# Patient Record
Sex: FEMALE | Race: BLACK OR AFRICAN AMERICAN | Employment: FULL TIME | URBAN - METROPOLITAN AREA
[De-identification: names, ages, dates, MRNs, and addresses within clinical notes are randomized per-mention and may not be internally consistent; named-entity substitution may affect disease eponyms.]

---

## 2017-10-14 PROBLEM — R10.9 ABDOMINAL PAIN: Status: ACTIVE | Noted: 2017-10-14

## 2017-10-14 PROBLEM — J90 PLEURAL EFFUSION: Status: ACTIVE | Noted: 2017-10-14

## 2017-10-14 PROBLEM — K55.069 MESENTERIC VEIN THROMBOSIS (HCC): Status: ACTIVE | Noted: 2017-10-14

## 2017-10-14 PROBLEM — I81 PORTAL VEIN THROMBOSIS: Status: ACTIVE | Noted: 2017-10-14

## 2018-04-12 ENCOUNTER — APPOINTMENT (OUTPATIENT)
Dept: CT IMAGING | Age: 33
DRG: 776 | End: 2018-04-12
Attending: PHYSICIAN ASSISTANT
Payer: COMMERCIAL

## 2018-04-12 ENCOUNTER — HOSPITAL ENCOUNTER (INPATIENT)
Age: 33
LOS: 2 days | Discharge: HOME | DRG: 776 | End: 2018-04-14
Attending: EMERGENCY MEDICINE | Admitting: INTERNAL MEDICINE
Payer: COMMERCIAL

## 2018-04-12 ENCOUNTER — APPOINTMENT (OUTPATIENT)
Dept: ULTRASOUND IMAGING | Age: 33
DRG: 776 | End: 2018-04-12
Attending: PHYSICIAN ASSISTANT
Payer: COMMERCIAL

## 2018-04-12 ENCOUNTER — APPOINTMENT (OUTPATIENT)
Dept: GENERAL RADIOLOGY | Age: 33
DRG: 776 | End: 2018-04-12
Attending: PHYSICIAN ASSISTANT
Payer: COMMERCIAL

## 2018-04-12 DIAGNOSIS — R91.1 PULMONARY NODULE, RIGHT: ICD-10-CM

## 2018-04-12 DIAGNOSIS — R00.0 TACHYCARDIA: ICD-10-CM

## 2018-04-12 DIAGNOSIS — T80.92XA BLOOD TRANSFUSION REACTION, INITIAL ENCOUNTER: ICD-10-CM

## 2018-04-12 DIAGNOSIS — D64.9 ANEMIA, UNSPECIFIED TYPE: Primary | ICD-10-CM

## 2018-04-12 DIAGNOSIS — N93.9 VAGINAL BLEEDING: ICD-10-CM

## 2018-04-12 LAB
ALBUMIN SERPL-MCNC: 2.7 G/DL (ref 3.4–5)
ALBUMIN/GLOB SERPL: 0.6 {RATIO} (ref 0.8–1.7)
ALP SERPL-CCNC: 77 U/L (ref 45–117)
ALT SERPL-CCNC: 13 U/L (ref 13–56)
ANION GAP SERPL CALC-SCNC: 13 MMOL/L (ref 3–18)
APPEARANCE UR: CLEAR
APTT PPP: 29.2 SEC (ref 23–36.4)
AST SERPL-CCNC: 14 U/L (ref 15–37)
BACTERIA URNS QL MICRO: ABNORMAL /HPF
BASOPHILS # BLD: 0 K/UL (ref 0–0.06)
BASOPHILS NFR BLD: 0 % (ref 0–2)
BILIRUB SERPL-MCNC: 0.3 MG/DL (ref 0.2–1)
BILIRUB UR QL: NEGATIVE
BUN SERPL-MCNC: 7 MG/DL (ref 7–18)
BUN/CREAT SERPL: 9 (ref 12–20)
CALCIUM SERPL-MCNC: 8.9 MG/DL (ref 8.5–10.1)
CHLORIDE SERPL-SCNC: 104 MMOL/L (ref 100–108)
CK MB CFR SERPL CALC: NORMAL % (ref 0–4)
CK MB SERPL-MCNC: <1 NG/ML (ref 5–25)
CK SERPL-CCNC: 33 U/L (ref 26–192)
CO2 SERPL-SCNC: 24 MMOL/L (ref 21–32)
COLOR UR: YELLOW
CREAT SERPL-MCNC: 0.79 MG/DL (ref 0.6–1.3)
DIFFERENTIAL METHOD BLD: ABNORMAL
EOSINOPHIL # BLD: 0.1 K/UL (ref 0–0.4)
EOSINOPHIL NFR BLD: 1 % (ref 0–5)
EPITH CASTS URNS QL MICRO: ABNORMAL /LPF (ref 0–5)
ERYTHROCYTE [DISTWIDTH] IN BLOOD BY AUTOMATED COUNT: 19.6 % (ref 11.6–14.5)
GLOBULIN SER CALC-MCNC: 4.4 G/DL (ref 2–4)
GLUCOSE SERPL-MCNC: 108 MG/DL (ref 74–99)
GLUCOSE UR STRIP.AUTO-MCNC: NEGATIVE MG/DL
HCT VFR BLD AUTO: 14.9 % (ref 35–45)
HGB BLD-MCNC: 4.5 G/DL (ref 12–16)
HGB UR QL STRIP: ABNORMAL
INR PPP: 1 (ref 0.8–1.2)
KETONES UR QL STRIP.AUTO: NEGATIVE MG/DL
LEUKOCYTE ESTERASE UR QL STRIP.AUTO: ABNORMAL
LYMPHOCYTES # BLD: 1.8 K/UL (ref 0.9–3.6)
LYMPHOCYTES NFR BLD: 18 % (ref 21–52)
MCH RBC QN AUTO: 30 PG (ref 24–34)
MCHC RBC AUTO-ENTMCNC: 30.2 G/DL (ref 31–37)
MCV RBC AUTO: 99.3 FL (ref 74–97)
MONOCYTES # BLD: 0.4 K/UL (ref 0.05–1.2)
MONOCYTES NFR BLD: 4 % (ref 3–10)
NEUTS SEG # BLD: 7.8 K/UL (ref 1.8–8)
NEUTS SEG NFR BLD: 77 % (ref 40–73)
NITRITE UR QL STRIP.AUTO: NEGATIVE
PH UR STRIP: >8.5 [PH] (ref 5–8)
PLATELET # BLD AUTO: 428 K/UL (ref 135–420)
PMV BLD AUTO: 8.7 FL (ref 9.2–11.8)
POTASSIUM SERPL-SCNC: 3.4 MMOL/L (ref 3.5–5.5)
PROT SERPL-MCNC: 7.1 G/DL (ref 6.4–8.2)
PROT UR STRIP-MCNC: NEGATIVE MG/DL
PROTHROMBIN TIME: 12.8 SEC (ref 11.5–15.2)
RBC # BLD AUTO: 1.5 M/UL (ref 4.2–5.3)
RBC #/AREA URNS HPF: ABNORMAL /HPF (ref 0–5)
RBC MORPH BLD: ABNORMAL
SODIUM SERPL-SCNC: 141 MMOL/L (ref 136–145)
SP GR UR REFRACTOMETRY: <1.005 (ref 1–1.03)
TROPONIN I SERPL-MCNC: <0.02 NG/ML (ref 0–0.06)
UROBILINOGEN UR QL STRIP.AUTO: 0.2 EU/DL (ref 0.2–1)
WBC # BLD AUTO: 10.1 K/UL (ref 4.6–13.2)
WBC URNS QL MICRO: ABNORMAL /HPF (ref 0–5)

## 2018-04-12 PROCEDURE — 80053 COMPREHEN METABOLIC PANEL: CPT | Performed by: PHYSICIAN ASSISTANT

## 2018-04-12 PROCEDURE — 86920 COMPATIBILITY TEST SPIN: CPT | Performed by: PHYSICIAN ASSISTANT

## 2018-04-12 PROCEDURE — 84702 CHORIONIC GONADOTROPIN TEST: CPT | Performed by: PHYSICIAN ASSISTANT

## 2018-04-12 PROCEDURE — 85025 COMPLETE CBC W/AUTO DIFF WBC: CPT | Performed by: PHYSICIAN ASSISTANT

## 2018-04-12 PROCEDURE — 74011250636 HC RX REV CODE- 250/636: Performed by: PHYSICIAN ASSISTANT

## 2018-04-12 PROCEDURE — 65660000000 HC RM CCU STEPDOWN

## 2018-04-12 PROCEDURE — 77030013169 SET IV BLD ICUM -A

## 2018-04-12 PROCEDURE — 71045 X-RAY EXAM CHEST 1 VIEW: CPT

## 2018-04-12 PROCEDURE — 85610 PROTHROMBIN TIME: CPT | Performed by: PHYSICIAN ASSISTANT

## 2018-04-12 PROCEDURE — 99285 EMERGENCY DEPT VISIT HI MDM: CPT

## 2018-04-12 PROCEDURE — P9016 RBC LEUKOCYTES REDUCED: HCPCS | Performed by: PHYSICIAN ASSISTANT

## 2018-04-12 PROCEDURE — 71275 CT ANGIOGRAPHY CHEST: CPT

## 2018-04-12 PROCEDURE — 82550 ASSAY OF CK (CPK): CPT | Performed by: PHYSICIAN ASSISTANT

## 2018-04-12 PROCEDURE — 94762 N-INVAS EAR/PLS OXIMTRY CONT: CPT

## 2018-04-12 PROCEDURE — 96360 HYDRATION IV INFUSION INIT: CPT

## 2018-04-12 PROCEDURE — 36430 TRANSFUSION BLD/BLD COMPNT: CPT

## 2018-04-12 PROCEDURE — 74011250637 HC RX REV CODE- 250/637: Performed by: PHYSICIAN ASSISTANT

## 2018-04-12 PROCEDURE — 86900 BLOOD TYPING SEROLOGIC ABO: CPT | Performed by: PHYSICIAN ASSISTANT

## 2018-04-12 PROCEDURE — 74011636320 HC RX REV CODE- 636/320: Performed by: EMERGENCY MEDICINE

## 2018-04-12 PROCEDURE — 96361 HYDRATE IV INFUSION ADD-ON: CPT

## 2018-04-12 PROCEDURE — 81001 URINALYSIS AUTO W/SCOPE: CPT | Performed by: PHYSICIAN ASSISTANT

## 2018-04-12 PROCEDURE — 76830 TRANSVAGINAL US NON-OB: CPT

## 2018-04-12 PROCEDURE — 85730 THROMBOPLASTIN TIME PARTIAL: CPT | Performed by: PHYSICIAN ASSISTANT

## 2018-04-12 PROCEDURE — 93005 ELECTROCARDIOGRAM TRACING: CPT

## 2018-04-12 PROCEDURE — 86078 PHYS BLOOD BANK SERV REACTJ: CPT | Performed by: PHYSICIAN ASSISTANT

## 2018-04-12 RX ORDER — ACETAMINOPHEN 500 MG
1000 TABLET ORAL
Status: COMPLETED | OUTPATIENT
Start: 2018-04-12 | End: 2018-04-12

## 2018-04-12 RX ORDER — SODIUM CHLORIDE 9 MG/ML
250 INJECTION, SOLUTION INTRAVENOUS AS NEEDED
Status: DISCONTINUED | OUTPATIENT
Start: 2018-04-12 | End: 2018-04-14 | Stop reason: HOSPADM

## 2018-04-12 RX ADMIN — SODIUM CHLORIDE 1000 ML: 900 INJECTION, SOLUTION INTRAVENOUS at 20:57

## 2018-04-12 RX ADMIN — ACETAMINOPHEN 1000 MG: 500 TABLET ORAL at 22:58

## 2018-04-12 RX ADMIN — IOPAMIDOL 100 ML: 755 INJECTION, SOLUTION INTRAVENOUS at 21:16

## 2018-04-12 RX ADMIN — SODIUM CHLORIDE 1000 ML: 900 INJECTION, SOLUTION INTRAVENOUS at 23:03

## 2018-04-12 NOTE — IP AVS SNAPSHOT
Mike Kee 
 
 
 [address] [facility] [address] 
[phone] Patient: Elaine Olivera MRN: [id number] [id number] About your hospitalization You were admitted on:  April 12, 2018 You last received care in the:  [address] You were discharged on:  April 14, 2018 Why you were hospitalized Your primary diagnosis was: Anemia Your diagnoses also included:  Vaginal Bleeding, Miscarriage, Atelectasis, Coagulation Defect (Hcc), Tachycardia, Pulmonary Nodule, Mesenteric Vein Thrombosis, Portal Vein Thrombosis Follow-up Information Follow up With Details Comments Contact Info None In 3 days  None ([phone] Patient stated that they have no PCP 
  
 GYN  In 3 days Dr. Nancy Joy oncology on Monday  In 2 days Discharge Orders None A check brigitte indicates which time of day the medication should be taken. My Medications CONTINUE taking these medications Instructions Each Dose to Equal  
 Morning Noon Evening Bedtime  
 ferrous sulfate 325 mg (65 mg iron) tablet Your last dose was: Your next dose is: Take 1 Tab by mouth daily (with breakfast). 325 mg  
    
   
   
   
  
 oxyCODONE-acetaminophen 5-325 mg per tablet Commonly known as:  PERCOCET Your last dose was: Your next dose is: Take 1 Tab by mouth every eight (8) hours as needed. Max Daily Amount: 3 Tabs. 1 Tab PriLOSEC 40 mg capsule Generic drug:  omeprazole Your last dose was: Your next dose is: Take 40 mg by mouth daily. 40 mg  
    
   
   
   
  
  
STOP taking these medications   
 rivaroxaban 15 mg (42)- 20 mg (9) Dspk Commonly known as:  Valorie Gonzalez Opioid Education  Prescription Opioids: What You Need to Know: 
 
Prescription opioids can be used to help relieve moderate-to-severe pain and are often prescribed following a surgery or injury, or for certain health conditions. These medications can be an important part of treatment but also come with serious risks. Opioids are strong pain medicines. Examples include hydrocodone, oxycodone, fentanyl, and morphine. Heroin is an example of an illegal opioid. It is important to work with your health care provider to make sure you are getting the safest, most effective care. WHAT ARE THE RISKS AND SIDE EFFECTS OF OPIOID USE? Prescription opioids carry serious risks of addiction and overdose, especially with prolonged use. An opioid overdose, often marked by slow breathing, can cause sudden death. The use of prescription opioids can have a number of side effects as well, even when taken as directed. · Tolerance-meaning you might need to take more of a medication for the same pain relief · Physical dependence-meaning you have symptoms of withdrawal when the medication is stopped. Withdrawal symptoms can include nausea, sweating, chills, diarrhea, stomach cramps, and muscle aches. Withdrawal can last up to several weeks, depending on which drug you took and how long you took it. · Increased sensitivity to pain · Constipation · Nausea, vomiting, and dry mouth · Sleepiness and dizziness · Confusion · Depression · Low levels of testosterone that can result in lower sex drive, energy, and strength · Itching and sweating RISKS ARE GREATER WITH:      
· History of drug misuse, substance use disorder, or overdose · Mental health conditions (such as depression or anxiety) · Sleep apnea · Older age (72 years or older) · Pregnancy Avoid alcohol while taking prescription opioids. Also, unless specifically advised by your health care provider, medications to avoid include: · Benzodiazepines (such as Xanax or Valium) · Muscle relaxants (such as Soma or Flexeril) · Hypnotics (such as Ambien or Lunesta) · Other prescription opioids KNOW YOUR OPTIONS Talk to your health care provider about ways to manage your pain that don't involve prescription opioids. Some of these options may actually work better and have fewer risks and side effects. Options may include: 
· Pain relievers such as acetaminophen, ibuprofen, and naproxen · Some medications that are also used for depression or seizures · Physical therapy and exercise · Counseling to help patients learn how to cope better with triggers of pain and stress. · Application of heat or cold compress · Massage therapy · Relaxation techniques Be Informed Make sure you know the name of your medication, how much and how often to take it, and its potential risks & side effects. IF YOU ARE PRESCRIBED OPIOIDS FOR PAIN: 
· Never take opioids in greater amounts or more often than prescribed. Remember the goal is not to be pain-free but to manage your pain at a tolerable level. · Follow up with your primary care provider to: · Work together to create a plan on how to manage your pain. · Talk about ways to help manage your pain that don't involve prescription opioids. · Talk about any and all concerns and side effects. · Help prevent misuse and abuse. · Never sell or share prescription opioids · Help prevent misuse and abuse. · Store prescription opioids in a secure place and out of reach of others (this may include visitors, children, friends, and family). · Safely dispose of unused/unwanted prescription opioids: Find your community drug take-back program or your pharmacy mail-back program, or flush them down the toilet, following guidance from the Food and Drug Administration (www.fda.gov/Drugs/ResourcesForYou). · Visit www.cdc.gov/drugoverdose to learn about the risks of opioid abuse and overdose. · If you believe you may be struggling with addiction, tell your health care provider and ask for guidance or call [employer] at 2-356-006-LBKJ. Discharge Instructions Anemia: Care Instructions Your Care Instructions Anemia is a low level of red blood cells, which carry oxygen throughout your body. Many things can cause anemia. Lack of iron is one of the most common causes. Your body needs iron to make hemoglobin, a substance in red blood cells that carries oxygen from the lungs to your body's cells. Without enough iron, the body produces fewer and smaller red blood cells. As a result, your body's cells do not get enough oxygen, and you feel tired and weak. And you may have trouble concentrating. Bleeding is the most common cause of a lack of iron. You may have heavy menstrual bleeding or bleeding caused by conditions such as ulcers, hemorrhoids, or cancer. Regular use of aspirin or other anti-inflammatory medicines (such as ibuprofen) also can cause bleeding in some people. A lack of iron in your diet also can cause anemia, especially at times when the body needs more iron, such as during pregnancy, infancy, and the teen years. Your doctor may have prescribed iron pills. It may take several months of treatment for your iron levels to return to normal. Your doctor also may suggest that you eat foods that are rich in iron, such as meat and beans. There are many other causes of anemia. It is not always due to a lack of iron. Finding the specific cause of your anemia will help your doctor find the right treatment for you. Follow-up care is a key part of your treatment and safety. Be sure to make and go to all appointments, and call your doctor if you are having problems. It's also a good idea to know your test results and keep a list of the medicines you take. How can you care for yourself at home? · Take your medicines exactly as prescribed. Call your doctor if you think you are having a problem with your medicine. · If your doctor recommends iron pills, take them as directed: ¨ Try to take the pills on an empty stomach about 1 hour before or 2 hours after meals. But you may need to take iron with food to avoid an upset stomach. ¨ Do not take antacids or drink milk or caffeine drinks (such as coffee, tea, or cola) at the same time or within 2 hours of the time that you take your iron. They can make it hard for your body to absorb the iron. ¨ Vitamin C (from food or supplements) helps your body absorb iron. Try taking iron pills with a glass of orange juice or some other food that is high in vitamin C, such as citrus fruits. ¨ Iron pills may cause stomach problems, such as heartburn, nausea, diarrhea, constipation, and cramps. Be sure to drink plenty of fluids, and include fruits, vegetables, and fiber in your diet each day. Iron pills often make your bowel movements dark or green. ¨ If you forget to take an iron pill, do not take a double dose of iron the next time you take a pill. ¨ Keep iron pills out of the reach of small children. An overdose of iron can be very dangerous. · Follow your doctor's advice about eating iron-rich foods. These include red meat, shellfish, poultry, eggs, beans, raisins, whole-grain bread, and leafy green vegetables. · Steam vegetables to help them keep their iron content. When should you call for help? Call 911 anytime you think you may need emergency care. For example, call if: 
? · You have symptoms of a heart attack. These may include: ¨ Chest pain or pressure, or a strange feeling in the chest. 
¨ Sweating. ¨ Shortness of breath. ¨ Nausea or vomiting. ¨ Pain, pressure, or a strange feeling in the back, neck, jaw, or upper belly or in one or both shoulders or arms. ¨ Lightheadedness or sudden weakness. ¨ A fast or irregular heartbeat. After you call 911, the  may tell you to chew 1 adult-strength or 2 to 4 low-dose aspirin. Wait for an ambulance.  Do not try to drive yourself. ? · You passed out (lost consciousness). ?Call your doctor now or seek immediate medical care if: 
? · You have new or increased shortness of breath. ? · You are dizzy or lightheaded, or you feel like you may faint. ? · Your fatigue and weakness continue or get worse. ? · You have any abnormal bleeding, such as: 
¨ Nosebleeds. ¨ Vaginal bleeding that is different (heavier, more frequent, at a different time of the month) than what you are used to. ¨ Bloody or black stools, or rectal bleeding. ¨ Bloody or pink urine. ? Watch closely for changes in your health, and be sure to contact your doctor if: 
? · You do not get better as expected. Where can you learn more? Go to http://darian-marcus.info/. Enter R301 in the search box to learn more about \"Anemia: Care Instructions. \" Current as of: October 13, 2016 Content Version: 11.4 © 5718-6882 [employer]. Care instructions adapted under license by [employer] (which disclaims liability or warranty for this information). If you have questions about a medical condition or this instruction, always ask your healthcare professional. [place] [address] any warranty or liability for your use of this information. [employer] Announcement We are excited to announce that we are making your provider's discharge notes available to you in [employer]centric. You will see these notes when they are completed and signed by the physician that discharged you from your recent hospital stay. If you have any questions or concerns about any information you see in Nascentric, please call the Health Information Department where you were seen or reach out to your Primary Care Provider for more information about your plan of care. Introducing [facility]! Dear Con Hu: Thank you for requesting a [employer] account.   Our records indicate that you already have an active [employer] account. You can access your account anytime at [email or web address] Did you know that you can access your hospital and ER discharge instructions at any time in Avexxin? You can also review all of your test results from your hospital stay or ER visit. Additional Information If you have questions, please visit the Frequently Asked Questions section of the [employer] website at [email or web address] Remember, Avexxin is NOT to be used for urgent needs. For medical emergencies, dial 911. Now available from your iPhone and Android! Introducing Rodney Torres As a [employer] [facility] patient, I wanted to make you aware of our electronic visit tool called Rodney Torres. [employer] allows you to connect within minutes with a medical provider 24 hours a day, seven days a week via a mobile device or tablet or logging into a secure website from your computer. You can access Rodney Torres from anywhere in the [place]. A virtual visit might be right for you when you have a simple condition and feel like you just dont want to get out of bed, or cant get away from work for an appointment, when your regular [employer] provider is not available (evenings, weekends or holidays), or when youre out of town and need minor care. Electronic visits cost only $49 and if the [employer] provider determines a prescription is needed to treat your condition, one can be electronically transmitted to a nearby pharmacy*. Please take a moment to enroll today if you have not already done so. The enrollment process is free and takes just a few minutes. To enroll, please download the K[employer] rosalie to your tablet or phone, or visit [email or web address] to enroll on your computer.    
And, as an [address] [place] [address] patient with a [employer] account, the results of your visits will be scanned into your electronic medical record and your primary care provider will be able to view the scanned results. We urge you to continue to see your regular Burt Ndiaye provider for your ongoing medical care. And while your primary care provider may not be the one available when you seek a Rodney Cabralesfin virtual visit, the peace of mind you get from getting a real diagnosis real time can be priceless. For more information on [employer], view our Frequently Asked Questions (FAQs) at [email or web address]. Sincerely, 
 
Hope Ponce MD 
Chief Medical Officer [address] *:  certain medications cannot be prescribed via [employer] Unresulted Labs-Please follow up with your PCP about these lab tests Order Current Status EKG, 12 LEAD, INITIAL Preliminary result Providers Seen During Your Hospitalization Provider Specialty Primary office phone Sadaf Young MD Emergency Medicine [phone] Yasmani Cid MD Internal Medicine [phone] Your Primary Care Physician (PCP) Primary Care Physician Office Phone Office Fax NONE ** None ** ** None ** You are allergic to the following No active allergies Recent Documentation Height Weight Breastfeeding? BMI OB Status Smoking Status 1.626 m 77 kg No 29.15 kg/m2 Pregnant Former Smoker Emergency Contacts Name Discharge Info Relation Home Work Mobile Samantha Hinkle DISCHARGE CAREGIVER [3] Parent [1] [phone] Patient Belongings The following personal items are in your possession at time of discharge: 
     Visual Aid: Glasses, With patient             Clothing: At bedside    Other Valuables:  (glasses, cell phone) Please provide this summary of care documentation to your next provider. Signatures-by signing, you are acknowledging that this After Visit Summary has been reviewed with you and you have received a copy. Patient Signature:  ____________________________________________________________ Date:  ____________________________________________________________  
  
Guinevere Coup Provider Signature:  ____________________________________________________________ Date:  ____________________________________________________________

## 2018-04-12 NOTE — IP AVS SNAPSHOT
[address] [address] 
[phone] Patient: Alyssa Harvey MRN: [id number] CVQ:7/4/1922 A check brigitte indicates which time of day the medication should be taken. My Medications CONTINUE taking these medications Instructions Each Dose to Equal  
 Morning Noon Evening Bedtime  
 ferrous sulfate 325 mg (65 mg iron) tablet Your last dose was: Your next dose is: Take 1 Tab by mouth daily (with breakfast). 325 mg  
    
   
   
   
  
 oxyCODONE-acetaminophen 5-325 mg per tablet Commonly known as:  PERCOCET Your last dose was: Your next dose is: Take 1 Tab by mouth every eight (8) hours as needed. Max Daily Amount: 3 Tabs. 1 Tab PriLOSEC 40 mg capsule Generic drug:  omeprazole Your last dose was: Your next dose is: Take 40 mg by mouth daily. 40 mg  
    
   
   
   
  
  
STOP taking these medications   
 rivaroxaban 15 mg (42)- 20 mg (9) Dspk Commonly known as:  Abner Whitmore

## 2018-04-12 NOTE — IP AVS SNAPSHOT
Summary of Care Report The Summary of Care report has been created to help improve care coordination. Users with access to [facility] or [address] (Web-based application) may access additional patient information including the Discharge Summary. If you are not currently a [address] user and need more information, please call the number listed below in the [place] [address] section and ask to be connected with Medical Records. Facility Information Name Address Phone [address] [place] [id number] [phone] Patient Information Patient Name Sex JT Gordon ([id number]) Female 1985 Discharge Information Admitting Provider Service Area Unit Monica Murry MD / [address] [place] [address]/Med / [phone] Discharge Provider Discharge Date/Time Discharge Disposition Destination (none) 2018 (Pending) AHR (none) Patient Language Language ENGLISH [13] Hospital Problems as of 2018  Reviewed: 2018  7:06 AM by Derick Mitchell MD  
  
  
  
 Class Noted - Resolved Last Modified POA Active Problems Portal vein thrombosis  10/14/2017 - Present 2018 by Monica Murry MD Yes Entered by Aaron Avery MD  
  Mesenteric vein thrombosis  10/14/2017 - Present 2018 by Monica Murry MD Yes Entered by Aaron Avery MD  
  Vaginal bleeding  2018 - Present 2018 by AKBAR Ulloa Unknown Entered by AKBAR Ulloa  
  * (Principal)Anemia  2018 - Present 2018 by Monica Murry MD Unknown Entered by AKBAR Ulloa Miscarriage  2018 - Present 2018 by Monica Murry MD Unknown   Entered by Monica Murry MD  
 Atelectasis  4/13/2018 - Present 4/13/2018 by Dorotha Goldmann, MD Unknown Entered by Dorotha Goldmann, MD  
  Coagulation defect [facility])  4/13/2018 - Present 4/13/2018 by Dorotha Goldmann, MD Unknown Entered by Dorotha Goldmann, MD  
  Tachycardia  4/13/2018 - Present 4/13/2018 by Dorotha Goldmann, MD Unknown Entered by Dorotha Goldmann, MD  
  Pulmonary nodule  4/13/2018 - Present 4/13/2018 by Dorotha Goldmann, MD Unknown Entered by Dorotha Goldmann, MD  
  
Non-Hospital Problems as of 4/14/2018  Reviewed: 4/13/2018  7:06 AM by Leyla Beck MD  
  
  
  
 Class Noted - Resolved Last Modified Active Problems Abdominal pain  10/14/2017 - Present 10/14/2017 by Alba Ramos MD  
  Entered by Alba Ramos MD  
  Pleural effusion  10/14/2017 - Present 10/23/2017 by Padmini Renteria MD  
  Entered by Alba Ramos MD  
  
You are allergic to the following No active allergies Current Discharge Medication List  
  
CONTINUE these medications which have NOT CHANGED Dose & Instructions Dispensing Information Comments  
 ferrous sulfate 325 mg (65 mg iron) tablet Dose:  325 mg Take 1 Tab by mouth daily (with breakfast). Quantity:  30 Tab Refills:  0  
   
 oxyCODONE-acetaminophen 5-325 mg per tablet Commonly known as:  PERCOCET Dose:  1 Tab Take 1 Tab by mouth every eight (8) hours as needed. Max Daily Amount: 3 Tabs. Quantity:  15 Tab Refills:  0 PriLOSEC 40 mg capsule Generic drug:  omeprazole Dose:  40 mg Take 40 mg by mouth daily. Refills:  0 STOP taking these medications Comments  
 rivaroxaban 15 mg (42)- 20 mg (9) Dspk Commonly known as:  Pilar Benes Follow-up Information Follow up With Details Comments Contact Info None In 3 days  None ([phone]) Patient stated that they have no PCP 
  
 GYN  In 3 days Dr. Saskia Farfan oncology on Monday  In 2 days Discharge Instructions Anemia: Care Instructions Your Care Instructions Anemia is a low level of red blood cells, which carry oxygen throughout your body. Many things can cause anemia. Lack of iron is one of the most common causes. Your body needs iron to make hemoglobin, a substance in red blood cells that carries oxygen from the lungs to your body's cells. Without enough iron, the body produces fewer and smaller red blood cells. As a result, your body's cells do not get enough oxygen, and you feel tired and weak. And you may have trouble concentrating. Bleeding is the most common cause of a lack of iron. You may have heavy menstrual bleeding or bleeding caused by conditions such as ulcers, hemorrhoids, or cancer. Regular use of aspirin or other anti-inflammatory medicines (such as ibuprofen) also can cause bleeding in some people. A lack of iron in your diet also can cause anemia, especially at times when the body needs more iron, such as during pregnancy, infancy, and the teen years. Your doctor may have prescribed iron pills. It may take several months of treatment for your iron levels to return to normal. Your doctor also may suggest that you eat foods that are rich in iron, such as meat and beans. There are many other causes of anemia. It is not always due to a lack of iron. Finding the specific cause of your anemia will help your doctor find the right treatment for you. Follow-up care is a key part of your treatment and safety. Be sure to make and go to all appointments, and call your doctor if you are having problems. It's also a good idea to know your test results and keep a list of the medicines you take. How can you care for yourself at home? · Take your medicines exactly as prescribed. Call your doctor if you think you are having a problem with your medicine. · If your doctor recommends iron pills, take them as directed: ¨ Try to take the pills on an empty stomach about 1 hour before or 2 hours after meals. But you may need to take iron with food to avoid an upset stomach. ¨ Do not take antacids or drink milk or caffeine drinks (such as coffee, tea, or cola) at the same time or within 2 hours of the time that you take your iron. They can make it hard for your body to absorb the iron. ¨ Vitamin C (from food or supplements) helps your body absorb iron. Try taking iron pills with a glass of orange juice or some other food that is high in vitamin C, such as citrus fruits. ¨ Iron pills may cause stomach problems, such as heartburn, nausea, diarrhea, constipation, and cramps. Be sure to drink plenty of fluids, and include fruits, vegetables, and fiber in your diet each day. Iron pills often make your bowel movements dark or green. ¨ If you forget to take an iron pill, do not take a double dose of iron the next time you take a pill. ¨ Keep iron pills out of the reach of small children. An overdose of iron can be very dangerous. · Follow your doctor's advice about eating iron-rich foods. These include red meat, shellfish, poultry, eggs, beans, raisins, whole-grain bread, and leafy green vegetables. · Steam vegetables to help them keep their iron content. When should you call for help? Call 911 anytime you think you may need emergency care. For example, call if: 
? · You have symptoms of a heart attack. These may include: ¨ Chest pain or pressure, or a strange feeling in the chest. 
¨ Sweating. ¨ Shortness of breath. ¨ Nausea or vomiting. ¨ Pain, pressure, or a strange feeling in the back, neck, jaw, or upper belly or in one or both shoulders or arms. ¨ Lightheadedness or sudden weakness. ¨ A fast or irregular heartbeat. After you call 911, the  may tell you to chew 1 adult-strength or 2 to 4 low-dose aspirin. Wait for an ambulance. Do not try to drive yourself. ? · You passed out (lost consciousness). ?Call your doctor now or seek immediate medical care if: 
? · You have new or increased shortness of breath. ? · You are dizzy or lightheaded, or you feel like you may faint. ? · Your fatigue and weakness continue or get worse. ? · You have any abnormal bleeding, such as: 
¨ Nosebleeds. ¨ Vaginal bleeding that is different (heavier, more frequent, at a different time of the month) than what you are used to. ¨ Bloody or black stools, or rectal bleeding. ¨ Bloody or pink urine. ? Watch closely for changes in your health, and be sure to contact your doctor if: 
? · You do not get better as expected. Where can you learn more? Go to http://darian-marcus.info/. Enter R301 in the search box to learn more about \"Anemia: Care Instructions. \" Current as of: October 13, 2016 Content Version: 11.4 © 4153-4799 [employer]. Care instructions adapted under license by [employer] (which disclaims liability or warranty for this information). If you have questions about a medical condition or this instruction, always ask your healthcare professional. [place] [address] any warranty or liability for your use of this information. Chart Review Routing History No Routing History on File

## 2018-04-12 NOTE — Clinical Note
Status[de-identified] Inpatient [101] Type of Bed: Intensive Care [6] Inpatient Hospitalization Certified Necessary for the Following Reasons: 3. Patient receiving treatment that can only be provided in an inpatient setting (further clarification in H&P documentation) Admitting Diagnosis: Anemia [029823] Admitting Diagnosis: Vaginal bleeding [136144] Admitting Physician: Senait Riley [[id number]] Attending Physician: Senait Riley [[id number]] Estimated Length of Stay: 2 Midnights Discharge Plan[de-identified] Home with Office Follow-up

## 2018-04-13 PROBLEM — D68.9 COAGULATION DEFECT (HCC): Status: ACTIVE | Noted: 2018-04-13

## 2018-04-13 PROBLEM — R91.1 PULMONARY NODULE: Status: ACTIVE | Noted: 2018-04-13

## 2018-04-13 PROBLEM — O03.9 MISCARRIAGE: Status: ACTIVE | Noted: 2018-04-13

## 2018-04-13 PROBLEM — J98.11 ATELECTASIS: Status: ACTIVE | Noted: 2018-04-13

## 2018-04-13 PROBLEM — R00.0 TACHYCARDIA: Status: ACTIVE | Noted: 2018-04-13

## 2018-04-13 LAB
ABO + RH BLD: NORMAL
ABO/RH(D) PRE-TXN, PRETT: NORMAL
BASOPHILS # BLD: 0 K/UL (ref 0–0.1)
BASOPHILS NFR BLD: 0 % (ref 0–3)
BLD PROD TYP BPU: NORMAL
BLOOD BAG HEMOLYSIS,BLBAG: NORMAL
CLERICAL ERRORS,CLERR: NORMAL
DAT P TRANSF RBC QL: NORMAL
DAT RBC QL: NORMAL
DIFFERENTIAL METHOD BLD: ABNORMAL
EOSINOPHIL # BLD: 0.1 K/UL (ref 0–0.4)
EOSINOPHIL NFR BLD: 1 % (ref 0–5)
ERYTHROCYTE [DISTWIDTH] IN BLOOD BY AUTOMATED COUNT: 18.6 % (ref 11.6–14.5)
FIBRINOGEN PPP-MCNC: 326 MG/DL (ref 210–451)
HCG SERPL-ACNC: 9 MIU/ML (ref 1–6)
HCT VFR BLD AUTO: 13.4 % (ref 35–45)
HCT VFR BLD AUTO: 21 % (ref 35–45)
HEMOLYSIS, POST TXN,PSTHE: NORMAL
HEMOLYSIS,PRE TXN,PREHE: NORMAL
HGB BLD-MCNC: 4.2 G/DL (ref 12–16)
HGB BLD-MCNC: 6.9 G/DL (ref 12–16)
LYMPHOCYTES # BLD: 0.7 K/UL (ref 0.8–3.5)
LYMPHOCYTES NFR BLD: 8 % (ref 20–51)
MCH RBC QN AUTO: 30.4 PG (ref 24–34)
MCHC RBC AUTO-ENTMCNC: 31.3 G/DL (ref 31–37)
MCV RBC AUTO: 97.1 FL (ref 74–97)
MONOCYTES # BLD: 0.4 K/UL (ref 0–1)
MONOCYTES NFR BLD: 4 % (ref 2–9)
NEUTS BAND NFR BLD MANUAL: 2 % (ref 0–5)
NEUTS SEG # BLD: 7.9 K/UL (ref 1.8–8)
NEUTS SEG NFR BLD: 85 % (ref 42–75)
NRBC BLD-RTO: 1 PER 100 WBC
PATH REV BLD -IMP: NORMAL
PLATELET # BLD AUTO: 262 K/UL (ref 135–420)
PMV BLD AUTO: 7.9 FL (ref 9.2–11.8)
RBC # BLD AUTO: 1.38 M/UL (ref 4.2–5.3)
RBC MORPH BLD: ABNORMAL
UNIT NUMBER,UN: NORMAL
WBC # BLD AUTO: 9.3 K/UL (ref 4.6–13.2)

## 2018-04-13 PROCEDURE — 85384 FIBRINOGEN ACTIVITY: CPT | Performed by: HOSPITALIST

## 2018-04-13 PROCEDURE — 74011250637 HC RX REV CODE- 250/637: Performed by: INTERNAL MEDICINE

## 2018-04-13 PROCEDURE — 36430 TRANSFUSION BLD/BLD COMPNT: CPT

## 2018-04-13 PROCEDURE — 85025 COMPLETE CBC W/AUTO DIFF WBC: CPT | Performed by: INTERNAL MEDICINE

## 2018-04-13 PROCEDURE — 74011250636 HC RX REV CODE- 250/636: Performed by: INTERNAL MEDICINE

## 2018-04-13 PROCEDURE — 74011250637 HC RX REV CODE- 250/637: Performed by: HOSPITALIST

## 2018-04-13 PROCEDURE — 65660000000 HC RM CCU STEPDOWN

## 2018-04-13 PROCEDURE — 36415 COLL VENOUS BLD VENIPUNCTURE: CPT | Performed by: HOSPITALIST

## 2018-04-13 PROCEDURE — 30233N1 TRANSFUSION OF NONAUTOLOGOUS RED BLOOD CELLS INTO PERIPHERAL VEIN, PERCUTANEOUS APPROACH: ICD-10-PCS | Performed by: INTERNAL MEDICINE

## 2018-04-13 PROCEDURE — 85018 HEMOGLOBIN: CPT | Performed by: HOSPITALIST

## 2018-04-13 PROCEDURE — P9016 RBC LEUKOCYTES REDUCED: HCPCS | Performed by: PHYSICIAN ASSISTANT

## 2018-04-13 PROCEDURE — 77030013131 HC IV BLD ST ICUM -A

## 2018-04-13 RX ORDER — SODIUM CHLORIDE 9 MG/ML
125 INJECTION, SOLUTION INTRAVENOUS CONTINUOUS
Status: DISCONTINUED | OUTPATIENT
Start: 2018-04-13 | End: 2018-04-14 | Stop reason: HOSPADM

## 2018-04-13 RX ORDER — OXYCODONE AND ACETAMINOPHEN 10; 325 MG/1; MG/1
1 TABLET ORAL
Status: DISCONTINUED | OUTPATIENT
Start: 2018-04-13 | End: 2018-04-13

## 2018-04-13 RX ORDER — ONDANSETRON 2 MG/ML
4 INJECTION INTRAMUSCULAR; INTRAVENOUS
Status: DISCONTINUED | OUTPATIENT
Start: 2018-04-13 | End: 2018-04-14 | Stop reason: HOSPADM

## 2018-04-13 RX ORDER — HYDROCODONE BITARTRATE AND ACETAMINOPHEN 5; 325 MG/1; MG/1
1 TABLET ORAL
Status: DISCONTINUED | OUTPATIENT
Start: 2018-04-13 | End: 2018-04-13

## 2018-04-13 RX ORDER — SODIUM CHLORIDE 9 MG/ML
250 INJECTION, SOLUTION INTRAVENOUS AS NEEDED
Status: DISCONTINUED | OUTPATIENT
Start: 2018-04-13 | End: 2018-04-14 | Stop reason: HOSPADM

## 2018-04-13 RX ORDER — TRANEXAMIC ACID 650 1/1
1300 TABLET ORAL 3 TIMES DAILY
Status: DISCONTINUED | OUTPATIENT
Start: 2018-04-13 | End: 2018-04-14

## 2018-04-13 RX ORDER — OXYCODONE AND ACETAMINOPHEN 10; 325 MG/1; MG/1
2 TABLET ORAL
Status: DISCONTINUED | OUTPATIENT
Start: 2018-04-13 | End: 2018-04-14 | Stop reason: HOSPADM

## 2018-04-13 RX ORDER — DIPHENHYDRAMINE HYDROCHLORIDE 50 MG/ML
12.5 INJECTION, SOLUTION INTRAMUSCULAR; INTRAVENOUS
Status: DISCONTINUED | OUTPATIENT
Start: 2018-04-13 | End: 2018-04-14 | Stop reason: HOSPADM

## 2018-04-13 RX ADMIN — TRANEXAMIC ACID 1300 MG: 650 TABLET ORAL at 15:53

## 2018-04-13 RX ADMIN — SODIUM CHLORIDE 125 ML/HR: 900 INJECTION, SOLUTION INTRAVENOUS at 02:11

## 2018-04-13 RX ADMIN — DIPHENHYDRAMINE HYDROCHLORIDE 12.5 MG: 50 INJECTION, SOLUTION INTRAMUSCULAR; INTRAVENOUS at 01:56

## 2018-04-13 RX ADMIN — OXYCODONE AND ACETAMINOPHEN 1 TABLET: 10; 325 TABLET ORAL at 14:23

## 2018-04-13 RX ADMIN — TRANEXAMIC ACID 1300 MG: 650 TABLET ORAL at 22:00

## 2018-04-13 RX ADMIN — ACETAMINOPHEN 650 MG: 325 SOLUTION ORAL at 17:26

## 2018-04-13 RX ADMIN — OXYCODONE AND ACETAMINOPHEN 1 TABLET: 10; 325 TABLET ORAL at 20:25

## 2018-04-13 NOTE — ED PROVIDER NOTES
EMERGENCY DEPARTMENT HISTORY AND PHYSICAL EXAM    Date: 4/12/2018  Patient Name: King Cronin    History of Presenting Illness     Chief Complaint   Patient presents with    Dizziness    Vaginal Bleeding         History Provided By: Patient    Chief Complaint: Vaginal bleeding  Duration: 2 Weeks  Timing:  Progressive and Worsening  Location: Vagina  Associated Symptoms: SOB, dizziness    Additional History (Context):   8:33 PM  King Cronin is a 29 y.o. female with PMHX of gastritis, blood clots, and recent anemia diagnosis (after D&C miscarriage) who presents to the emergency department C/O vaginal bleeding, onset 2 weeks ago s/p still birth at ~4 months. Associated sxs include SOB, dizziness. Pt reports that she is using 1 pad/hour. Pt states that she was started on Xarelto in October for \"abd clots\" switched to Lovenox in December when she was found to be pregnant and back to Xarelto a few weeks ago after miscarriage. Pt's OB is Dr. Floresita Duke. Pt denies abd pain (although she has a fibroid), leg swelling, hx of transfusion, and any other sxs or complaints.      PCP: None    Current Facility-Administered Medications   Medication Dose Route Frequency Provider Last Rate Last Dose    0.9% sodium chloride infusion  125 mL/hr IntraVENous CONTINUOUS Chana Castro  mL/hr at 04/13/18 0211 125 mL/hr at 04/13/18 0211    acetaminophen (TYLENOL) solution 650 mg  650 mg Oral Q4H PRN Chana Castro MD        HYDROcodone-acetaminophen (NORCO) 5-325 mg per tablet 1 Tab  1 Tab Oral Q4H PRN Chana Castro MD        diphenhydrAMINE (BENADRYL) injection 12.5 mg  12.5 mg IntraVENous Q4H PRN Chana Castro MD   12.5 mg at 04/13/18 0156    ondansetron (ZOFRAN) injection 4 mg  4 mg IntraVENous Q4H PRN Chana Castro MD        0.9% sodium chloride infusion 250 mL  250 mL IntraVENous PRN Chana Castro MD        tranexamic acid (LYSTEDA) tablet 1,300 mg  1,300 mg Oral TID Boy Fontana MD        0.9% sodium chloride infusion 250 mL  250 mL IntraVENous PRN AKBAR Neri         Current Outpatient Prescriptions   Medication Sig Dispense Refill    ferrous sulfate 325 mg (65 mg iron) tablet Take 1 Tab by mouth daily (with breakfast). 30 Tab 0    oxyCODONE-acetaminophen (PERCOCET) 5-325 mg per tablet Take 1 Tab by mouth every eight (8) hours as needed. Max Daily Amount: 3 Tabs. 15 Tab 0    rivaroxaban (XARELTO) 15 mg (42)- 20 mg (9) DsPk Take one 15 mg tablet twice a day with food for the first 21 days. Then, take one 20 mg tablet once a day with food for 9 days. Indications: splenic and mesenteric vein thrombosis 1 Dose Pack 0    omeprazole (PRILOSEC) 40 mg capsule Take 40 mg by mouth daily. Past History     Past Medical History:  Past Medical History:   Diagnosis Date    Anemia     Anemia     History of blood clots 10/15/2017    Intestinal       Past Surgical History:  Past Surgical History:   Procedure Laterality Date    HX DILATION AND CURETTAGE         Family History:  History reviewed. No pertinent family history. Social History:  Social History   Substance Use Topics    Smoking status: Former Smoker    Smokeless tobacco: Never Used    Alcohol use No       Allergies:  No Known Allergies      Review of Systems   Review of Systems   Respiratory: Positive for shortness of breath. Cardiovascular: Negative for chest pain. Gastrointestinal: Negative for abdominal pain. Genitourinary: Positive for vaginal bleeding. Neurological: Positive for dizziness and light-headedness. All other systems reviewed and are negative.       Physical Exam     Vitals:    04/13/18 0000 04/13/18 0006 04/13/18 0130 04/13/18 0200   BP: (!) 124/92  112/66 112/66   Pulse: (!) 125  (!) 108 (!) 119   Resp: 20  17 18   Temp:  99.2 °F (37.3 °C)  98.7 °F (37.1 °C)   SpO2: 100%  100% 100%   Weight:       Height:         Physical Exam   Constitutional: She is oriented to person, place, and time. She appears well-developed and well-nourished. Alert, lying on stretcher, appears slightly anxious    HENT:   Head: Normocephalic and atraumatic. Neck: Normal range of motion. Neck supple. Cardiovascular: Regular rhythm, normal heart sounds and intact distal pulses. Tachycardia present. No murmur heard. Pulmonary/Chest: Effort normal and breath sounds normal. No respiratory distress. She has no wheezes. She has no rales. Abdominal: Soft. Bowel sounds are normal. She exhibits no distension. There is no tenderness. There is no rebound and no guarding. Large palpable uterine fibroid    Genitourinary: Uterus is enlarged. Right adnexum displays no tenderness. Left adnexum displays no tenderness. Genitourinary Comments: Moderate amount of bleeding, unable to fully visualize cervix due to bleeding, no tenderness, + enlarged uterus    Neurological: She is alert and oriented to person, place, and time. Skin: Skin is warm and dry. Psychiatric: She has a normal mood and affect. Judgment normal.   Nursing note and vitals reviewed. Diagnostic Study Results     Labs -     Recent Results (from the past 12 hour(s))   CBC WITH AUTOMATED DIFF    Collection Time: 04/12/18  8:30 PM   Result Value Ref Range    WBC 10.1 4.6 - 13.2 K/uL    RBC 1.50 (L) 4.20 - 5.30 M/uL    HGB 4.5 (LL) 12.0 - 16.0 g/dL    HCT 14.9 (LL) 35.0 - 45.0 %    MCV 99.3 (H) 74.0 - 97.0 FL    MCH 30.0 24.0 - 34.0 PG    MCHC 30.2 (L) 31.0 - 37.0 g/dL    RDW 19.6 (H) 11.6 - 14.5 %    PLATELET 233 (H) 119 - 420 K/uL    MPV 8.7 (L) 9.2 - 11.8 FL    NEUTROPHILS 77 (H) 40 - 73 %    LYMPHOCYTES 18 (L) 21 - 52 %    MONOCYTES 4 3 - 10 %    EOSINOPHILS 1 0 - 5 %    BASOPHILS 0 0 - 2 %    ABS. NEUTROPHILS 7.8 1.8 - 8.0 K/UL    ABS. LYMPHOCYTES 1.8 0.9 - 3.6 K/UL    ABS. MONOCYTES 0.4 0.05 - 1.2 K/UL    ABS. EOSINOPHILS 0.1 0.0 - 0.4 K/UL    ABS.  BASOPHILS 0.0 0.0 - 0.06 K/UL    DF AUTOMATED      RBC COMMENTS POLYCHROMASIA  2+        RBC COMMENTS ANISOCYTOSIS  1+        RBC COMMENTS SPHEROCYTES  2+        RBC COMMENTS HYPOCHROMIA  1+       METABOLIC PANEL, COMPREHENSIVE    Collection Time: 04/12/18  8:30 PM   Result Value Ref Range    Sodium 141 136 - 145 mmol/L    Potassium 3.4 (L) 3.5 - 5.5 mmol/L    Chloride 104 100 - 108 mmol/L    CO2 24 21 - 32 mmol/L    Anion gap 13 3.0 - 18 mmol/L    Glucose 108 (H) 74 - 99 mg/dL    BUN 7 7.0 - 18 MG/DL    Creatinine 0.79 0.6 - 1.3 MG/DL    BUN/Creatinine ratio 9 (L) 12 - 20      GFR est AA >60 >60 ml/min/1.73m2    GFR est non-AA >60 >60 ml/min/1.73m2    Calcium 8.9 8.5 - 10.1 MG/DL    Bilirubin, total 0.3 0.2 - 1.0 MG/DL    ALT (SGPT) 13 13 - 56 U/L    AST (SGOT) 14 (L) 15 - 37 U/L    Alk.  phosphatase 77 45 - 117 U/L    Protein, total 7.1 6.4 - 8.2 g/dL    Albumin 2.7 (L) 3.4 - 5.0 g/dL    Globulin 4.4 (H) 2.0 - 4.0 g/dL    A-G Ratio 0.6 (L) 0.8 - 1.7     URINALYSIS W/ RFLX MICROSCOPIC    Collection Time: 04/12/18  8:30 PM   Result Value Ref Range    Color YELLOW      Appearance CLEAR      Specific gravity <1.005 (L) 1.005 - 1.030    pH (UA) >8.5 (H) 5.0 - 8.0    Protein NEGATIVE  NEG mg/dL    Glucose NEGATIVE  NEG mg/dL    Ketone NEGATIVE  NEG mg/dL    Bilirubin NEGATIVE  NEG      Blood LARGE (A) NEG      Urobilinogen 0.2 0.2 - 1.0 EU/dL    Nitrites NEGATIVE  NEG      Leukocyte Esterase SMALL (A) NEG     CARDIAC PANEL,(CK, CKMB & TROPONIN)    Collection Time: 04/12/18  8:30 PM   Result Value Ref Range    CK 33 26 - 192 U/L    CK - MB <1.0 <3.6 ng/ml    CK-MB Index  0.0 - 4.0 %     CALCULATION NOT PERFORMED WHEN RESULT IS BELOW LINEAR LIMIT    Troponin-I, Qt. <0.02 0.00 - 0.06 NG/ML   PROTHROMBIN TIME + INR    Collection Time: 04/12/18  8:30 PM   Result Value Ref Range    Prothrombin time 12.8 11.5 - 15.2 sec    INR 1.0 0.8 - 1.2     PTT    Collection Time: 04/12/18  8:30 PM   Result Value Ref Range    aPTT 29.2 23.0 - 36.4 SEC   URINE MICROSCOPIC ONLY    Collection Time: 04/12/18  8:30 PM   Result Value Ref Range    WBC 2 to 4 0 - 5 /hpf    RBC 15 to 20 0 - 5 /hpf    Epithelial cells FEW 0 - 5 /lpf    Bacteria FEW (A) NEG /hpf   TYPE & SCREEN    Collection Time: 04/12/18  8:33 PM   Result Value Ref Range    Crossmatch Expiration 04/15/2018     ABO/Rh(D) O POSITIVE     Antibody screen NEG     CALLED TO:       2 PRBCs READY [facility] RN ER AT 2146 ON [id number] BY Aristeo Randolph    CALLED TO: [facility] ER BY JAISON AT 0146,  BLOOD IS READY     Unit number [id number]     Blood component type  LR     Unit division 00     Status of unit ISSUED     Crossmatch result Compatible     Unit number [id number]     Blood component type  LR     Unit division 00     Status of unit ISSUED     Crossmatch result Compatible     Unit number [id number]     Blood component type  LR     Unit division 00     Status of unit ALLOCATED     Crossmatch result Compatible     Unit number [id number]     Blood component type  LR     Unit division 00     Status of unit ALLOCATED     Crossmatch result Compatible    EKG, 12 LEAD, INITIAL    Collection Time: 04/12/18  8:42 PM   Result Value Ref Range    Ventricular Rate 133 BPM    Atrial Rate 133 BPM    P-R Interval 104 ms    QRS Duration 70 ms    Q-T Interval 388 ms    QTC Calculation (Bezet) 577 ms    Calculated P Axis 20 degrees    Calculated R Axis 51 degrees    Calculated T Axis 50 degrees    Diagnosis       Sinus tachycardia with short MN  Nonspecific ST and T wave abnormality  Abnormal ECG  No previous ECGs available     TRANSFUSION REACTION    Collection Time: 04/12/18 11:00 PM   Result Value Ref Range    Unit Number [id number]     Clerical Errors NEG     Pre-txfusion hemolysis: NEG     Post-txfusion hemolysis: NEG     Blood bag hemolysis: NEG     Direct Andria,pre-txfusion NEG     Direct Andria,post-txfusion NEG     PATHOLOGIST INTERP: PENDING     Component Type PACKED CELLS     Blood type,post-txn O  POS       ABO/Rh(D),Pre-txfsn O  POS      CBC WITH AUTOMATED DIFF    Collection Time: 04/13/18  1:15 AM   Result Value Ref Range    WBC 9.3 4.6 - 13.2 K/uL    RBC 1.38 (L) 4.20 - 5.30 M/uL    HGB 4.2 (LL) 12.0 - 16.0 g/dL    HCT 13.4 (LL) 35.0 - 45.0 %    MCV 97.1 (H) 74.0 - 97.0 FL    MCH 30.4 24.0 - 34.0 PG    MCHC 31.3 31.0 - 37.0 g/dL    RDW 18.6 (H) 11.6 - 14.5 %    PLATELET 443 875 - 897 K/uL    MPV 7.9 (L) 9.2 - 11.8 FL    NEUTROPHILS PENDING %    LYMPHOCYTES PENDING %    MONOCYTES PENDING %    EOSINOPHILS PENDING %    BASOPHILS PENDING %    ABS. NEUTROPHILS PENDING K/UL    ABS. LYMPHOCYTES PENDING K/UL    ABS. MONOCYTES PENDING K/UL    ABS. EOSINOPHILS PENDING K/UL    ABS. BASOPHILS PENDING K/UL    DF PENDING        Radiologic Studies -   XR CHEST PORT   Final Result   Impression:  --------------     No active pulmonary disease. As read by the radiologist.   Kendrick Gorman   Final Result   IMPRESSION:     Large partially necrotic uterine fibroid. No discernible endometrial stripe,  retained products of conception are not likely. As read by the radiologist.   CTA CHEST W OR W WO CONT   Final Result   IMPRESSION:     1. Suboptimal due to respiratory motion, cannot evaluate segmental and smaller  vessels, especially in the lower lobes. No large or central pulmonary arterial  filling defect. 2.  Left lower lobe linear atelectasis. 3. 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 As a precaution, recommend follow-up CT imaging as  outlined below. As read by the radiologist.     CT Results  (Last 48 hours)               04/12/18 2125  CTA CHEST W OR W WO CONT Final result    Impression:  IMPRESSION:       1. Suboptimal due to respiratory motion, cannot evaluate segmental and smaller   vessels, especially in the lower lobes. No large or central pulmonary arterial   filling defect. 2.  Left lower lobe linear atelectasis.    3. 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 As a precaution, recommend follow-up CT imaging as   outlined below.       ========       Fleischner Society pulmonary nodule guidelines (revised 2017): Solid nodule 6-8 mm:   -Low risk for lung cancer: CT at 6-12 months, then consider CT at 18-24 months.   -High risk for lung cancer: CT at 6-12 months, then CT at 18-24 months. Narrative:  EXAM: CTA Chest       INDICATION: Status post childbirth 2 weeks ago (stillborn). Heavy bleeding since   then. Shortness of breath. History of clots. COMPARISON: None       TECHNIQUE: Helical volumetric scanning was performed from the thoracic inlet   through the diaphragm with nonionic intravenous contrast. Axial, and coronal and   sagittal MIP reconstructions were generated. One or more dose reduction   techniques were used on this CT: automated exposure control, adjustment of the   mAs and/or kVp according to patient's size, and iterative reconstruction   techniques. The specific techniques utilized on this CT exam have been   documented in the patient's electronic medical record.       _______________       FINDINGS:       EXAM QUALITY: Overall exam quality is suboptimal due to suboptimal   breath-holding. Pulmonary arterial enhancement is optimal.       PULMONARY ARTERIES: No gross large or central pulmonary arteries. Cannot   evaluate segmental and subsegmental pulmonary arteries optimally, particularly   in the lower lobes, due to respiratory motion. MEDIASTINUM: Normal heart size. No evidence of right heart strain. Aorta is   unremarkable. No pericardial effusion. LUNGS: Anterior right middle lobe subpleural nodule is present measuring about 7   mm, oval in shape with smooth borders, peripheral rim of hyperdensity, centrally   somewhat low density measuring -14 Hounsfield units. No other pulmonary nodules. No focal consolidation.  Atelectatic band present in the anterior basal to   lateral basal left lower lobe. PLEURA: Normal.       AIRWAYS: Normal.       LYMPH NODES: No enlarged nodes. UPPER ABDOMEN: Within normal range for technique. OTHER: No acute or aggressive osseous abnormalities identified. _______________               CXR Results  (Last 50 hours)               04/12/18 2045  XR CHEST PORT Final result    Impression:  Impression:   --------------       No active pulmonary disease. Narrative:  ---------------------------------------------------------------------------   <<<<<<<<<           [facility]           >>>>>>>>>    ---------------------------------------------------------------------------       CLINICAL HISTORY:  Shortness of breath. COMPARISON EXAMINATIONS:  None. ---  SINGLE FRONTAL VIEW OF THE CHEST  ---       The lungs and pleural spaces are clear. The mediastinum is unremarkable in   appearance.   No significant osseous abnormalities are identified.             --------------             Medications given in the ED-  Medications   0.9% sodium chloride infusion 250 mL (not administered)   0.9% sodium chloride infusion (125 mL/hr IntraVENous New Bag 4/13/18 9391)   acetaminophen (TYLENOL) solution 650 mg (not administered)   HYDROcodone-acetaminophen (NORCO) 5-325 mg per tablet 1 Tab (not administered)   diphenhydrAMINE (BENADRYL) injection 12.5 mg (12.5 mg IntraVENous Given 4/13/18 8166)   ondansetron (ZOFRAN) injection 4 mg (not administered)   0.9% sodium chloride infusion 250 mL (not administered)   tranexamic acid (LYSTEDA) tablet 1,300 mg (not administered)   sodium chloride 0.9 % bolus infusion 1,000 mL (0 mL IntraVENous IV Completed 4/12/18 2236)   iopamidol (ISOVUE-370) 76 % injection 100 mL (100 mL IntraVENous Given 4/12/18 2116)   sodium chloride 0.9 % bolus infusion 1,000 mL (1,000 mL IntraVENous New Bag 4/12/18 2303)   acetaminophen (TYLENOL) tablet 1,000 mg (1,000 mg Oral Given 4/12/18 2258) Medical Decision Making   I am the first provider for this patient. I reviewed the vital signs, available nursing notes, past medical history, past surgical history, family history and social history. Vital Signs-Reviewed the patient's vital signs. Pulse Oximetry Analysis - 100% on RA     EKG interpretation: (Preliminary)  8:49 PM   133 bpm. Sinus tachycardia with short ME. No STEMI. EKG read by Alise Ba PA-C at 8:49 PM    Records Reviewed: Nursing Notes    Provider Notes (Medical Decision Making):     Procedures:  Procedures    ED Course:   8:33 PM   Initial assessment performed. The patients presenting problems have been discussed, and they are in agreement with the care plan formulated and outlined with them. I have encouraged them to ask questions as they arise throughout their visit. CONSULT NOTE:   8:41 PM  Alise Ba PA-C spoke with Lisa Chauhan MD   Specialty: ED Medicine  Discussed pt's hx, disposition, and available diagnostic and imaging results  in person. Reviewed care plans. Consulting physician agrees with plans as outlined. Agrees with plan. PROCEDURE NOTE - PELVIC EXAM:    9:32 PM  Performed by: Alise Ba PA-C  Pelvic exam was performed using bimanual and speculum. Further findings noted in physical exam. Moderate amount of blood observed. No tenderness. Procedure chaperoned by nursing staff. The procedure took 1-15 minutes, and pt tolerated well. Written by Eitan Downey ED Scribe, as dictated by Alise Ba PA-C.    CONSULT NOTE:   9:44 PM  Alise Ba PA-C spoke with Lisa Chauhan MD   Specialty: ED Medicine  Discussed pt's hx, disposition, and available diagnostic and imaging results  in person. Reviewed care plans. Consulting physician agrees with plans as outlined.   Agrees with plan to transfuse and consult with OB-GYN and Hospitalist.    CONSULT NOTE:   9:52 PM  Alise Ba PA-C spoke with Dr. Jacque Casillas  Specialty: OB-GYN  Discussed pt's hx, disposition, and available diagnostic and imaging results  over the telephone. Reviewed care plans. Consulting physician agrees with plans as outlined. Agrees with transvaginal US to get a better idea of endometrium. CONSULT NOTE:   10:48 PM  Time SEAN Ba spoke with Dr. John Swain   Specialty: OB-GYN  Discussed pt's hx, disposition, and available diagnostic and imaging results  over the telephone. Reviewed care plans. Consulting physician agrees with plans as outlined. Agrees with checking a beta. CONSULT NOTE:   10:48 PM  Time SEAN Ba spoke with Tesha Olivares MD   Specialty: Hospitalist  Discussed pt's hx, disposition, and available diagnostic and imaging results  in person. Reviewed care plans. Consulting physician agrees with plans as outlined. Will accept to ICU.      10:53 PM  Pt's temperature has increased to 102F. Dr. Petty Garvey is aware. 11:03 PM  Transfusion stopped according to protocol. Dr. Petty Garvey is aware. 1:45 AM  I have spent 121 minutes of critical care time involved in lab review, consultations with specialist, family decision-making, and documentation. During this entire length of time I was immediately available to the patient. Critical Care: The reason for providing this level of medical care for this critically ill patient was due a critical illness that impaired one or more vital organ systems such that there was a high probability of imminent or life threatening deterioration in the patients condition. This care involved high complexity decision making to assess, manipulate, and support vital system functions, to treat this degreee vital organ system failure and to prevent further life threatening deterioration of the patients condition.       Diagnosis and Disposition     ADMISSION NOTE:  10:54 PM  Patient is being admitted to the hospital by Tesha Olivares MD. The results of their tests and reasons for their admission have been discussed with them and/or available family. They convey agreement and understanding for the need to be admitted and for their admission diagnosis. Written by HILLARY Crum, as dictated by Alise Ba PA-C.    CONDITIONS ON ADMISSION:  Sepsis is not present at the time of admission. Deep Vein Thrombosis is not present at the time of admission. Thrombosis is not present at the time of admission. Urinary Tract Infection is not present at the time of admission. Pneumonia is not present at the time of admission. MRSA is not present at the time of admission. Wound infection is not present at the time of admission. Pressure Ulcer is not present at the time of admission. CLINICAL IMPRESSION    1. Anemia, unspecified type    2. Vaginal bleeding    3. Tachycardia    4. Pulmonary nodule, right    5. Blood transfusion reaction, initial encounter        _______________________________    Attestations: This note is prepared by Nate Escobar, acting as Scribe for Alise Ba PA-C. Alise Ba PA-C:  The scribe's documentation has been prepared under my direction and personally reviewed by me in its entirety.   I confirm that the note above accurately reflects all work, treatment, procedures, and medical decision making performed by me.  _______________________________

## 2018-04-13 NOTE — ROUTINE PROCESS
Bedside and Verbal shift change report given to Mary Jaimes (oncoming nurse) by Jennifer He RN   (offgoing nurse). Report included the following information SBAR, Kardex, Intake/Output, MAR and Recent Results.

## 2018-04-13 NOTE — CONSULTS
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 Has large fibroid that appears to be dissintigrating via U/S done last PM.  The endometrium appears clean. I.e. No retained products. She thinks this grew while she was pregnant. Her bleeding after delivery was just spotting, was seen by her OB in [place] on Monday after her bleeding had picked up, was given cytotec which she didn't take. Her bleeding now is just spotting and she is on the same pad as last night. On exam her uterus palpates 20 weeks and is not tender. The rest of her PE is as per the H+P, vaginal exam deferred at this time. Beta hCG = 9 which is to be expected in this time line. A./P: 1. S/p stillborn at 25 weeks with vaginal bleeding before delivery, all likely due to the fibroid. She needs myomectomy but needs to be stable. I would also recommend 6 months of Lupron before surgery. 2. Anemia, severe, chronic, recent hx of heavy vaginal bleeding and blood loss around delivery. At this point her bleeding is minimal and a D+C would not be helpful as no retained tissue. Would also hold cytotec but is an option if picks back up. Its effect is compromised by the size of this fibroid. 3. Other medical problems as per Hospitalist.  I think she could be discharged once hemostatically stable. Could restart Xarelto if okay with Hematology. She will followup with Dr. Katia Yee in [place] for gyn care. [place]

## 2018-04-13 NOTE — ED TRIAGE NOTES
Pt complains of heavy vaginal bleeding x 2 weeks. Pt had still birth x 2 weeks ago and was discharged with anemia. Pt states today is having dizziness, shortness of breath and changing pad 1/hr.

## 2018-04-13 NOTE — CDMP QUERY
Please clarify if this patient is being treated/managed for:    =>Acute blood loss anemia in setting of vaginal bleeding /fibroids  =>Other Explanation of clinical findings  =>Unable to Determine (no explanation of clinical findings)    The medical record reflects the following:    Risk:fibroids,     Clinical Indicators: H&H 4.5/14.9, vaginal bleeding     Treatment: 2 units prbc     Please clarify and document your clinical opinion in the progress notes and discharge summary including the definitive and/or presumptive diagnosis, (suspected or probable), related to the above clinical findings. Please include clinical findings supporting your diagnosis. If you DECLINE this query or would like to communicate with [facility], please utilize the \"[employer] message box\" at the TOP of the Progress Note on the right.       Thank you,  Venus Miles RN [facility] [phone]

## 2018-04-13 NOTE — PROGRESS NOTES
Chart reviewed. Met with patient and pts boyfriend at bedside. Pt admitted for anemia. Per [facility]ascual, pt will likely dc tomorrow. Pt states she lives with boyfriend. States boyfriend or family members drive her to [facility]. Pt denies having PCP. Will ask CMS Sharlene to assist in PCP appt. Pt states she has follow up appts with [facility] scheduled. Pt denies using any DME. Pt denies using any HH services. No other needs identified. CM will cont to follow for discharge planning needs. Care Management Interventions  PCP Verified by CM: No (denies having PCP)  Mode of Transport at Discharge:  Other (see comment) (family,boyfriend)  Transition of Care Consult (CM Consult): Discharge Planning  Current Support Network: Lives with Spouse (LIVES WITH BOYFRIEND)  Confirm Follow Up Transport: Family (Family or boyfriend)  Plan discussed with Pt/Family/Caregiver: Yes  Discharge Location  Discharge Placement: Home with family assistance

## 2018-04-13 NOTE — ROUTINE PROCESS
TRANSFER - OUT REPORT:    Verbal report given to bernice carnes(name) on [facility]  being transferred to [address] [place] [address](unit) for routine progression of care       Report consisted of patients Situation, Background, Assessment and   Recommendations(SBAR). Information from the following report(s) SBAR, Kardex and ED Summary was reviewed with the receiving nurse. Lines:   Peripheral IV 04/12/18 Left (Active)        Opportunity for questions and clarification was provided.       Patient transported with:   Monitor  Registered Nurse

## 2018-04-13 NOTE — ED NOTES
Blood administration started 0210, pt on monitor family at bedside.  Pt pre medicated prior to bld start

## 2018-04-13 NOTE — PROGRESS NOTES
Hospitalist Progress Note-critical care note     Patient: Alyssa Harvey MRN: [id number]  CSN: [id number]    YOB: 1985  Age: 29 y.o. Sex: female    DOA: 4/12/2018 LOS:  LOS: 0 days            Chief complaint: vaginal bleeding/miscarriage,  Portal vein thrombosis , anemia     Assessment/Plan         Hospital Problems  Date Reviewed: 4/13/2018          Codes Class Noted POA    Miscarriage ICD-10-CM: O03.9  ICD-9-CM: 634.90  4/13/2018 Unknown        Atelectasis ICD-10-CM: J98.11  ICD-9-CM: 518.0  4/13/2018 Unknown        Coagulation defect ([address] ICD-10-CM: D68.9  ICD-9-CM: 286.9  4/13/2018 Unknown        Tachycardia ICD-10-CM: R00.0  ICD-9-CM: 785.0  4/13/2018 Unknown        Pulmonary nodule ICD-10-CM: R91.1  ICD-9-CM: 793.11  4/13/2018 Unknown        Vaginal bleeding ICD-10-CM: N93.9  ICD-9-CM: 623.8  4/12/2018 Unknown        * (Principal)Anemia ICD-10-CM: D64.9  ICD-9-CM: 285.9  4/12/2018 Unknown        Portal vein thrombosis ICD-10-CM: V31  ICD-9-CM: 020  10/14/2017 Yes        Mesenteric vein thrombosis ICD-10-CM: Q50  ICD-9-CM: 557.0  10/14/2017 Yes              Severe anemia -Acute blood loss anemia in setting of vaginal bleeding   Due to vaginal bleeding,  Received 2 units over night, will check h/h , will transfuse if h/h <8/24    Vaginal bleeding /miscarriage   Improving, xarelto was hold   Ob on board   Recent miscarriage   Recommend lysteda -she will check with her gyn     Portal vein thrombosis   On xarelto before admission,   Case discussed with Dr. Bhupinder lagos hemo/onc  Will hold xarelto for now due to active bleeding   will check fibrinogen      Subjective: vaginal bleeding is better , why I can not  Eat   Nurse: received  2 units prbc      Disposition : 1-2 days   Review of systems:    General: No fevers or chills. Cardiovascular: No chest pain or pressure. No palpitations. Pulmonary: No shortness of breath. Gastrointestinal: No nausea, vomiting.      Vital signs/Intake and Output:  Visit Vitals    /76 (BP 1 Location: Right arm, BP Patient Position: At rest)    Pulse 100    Temp 99.3 °F (37.4 °C)    Resp 18    Ht 5' 4\" (1.626 m)    Wt 77 kg (169 lb 12.8 oz)    SpO2 100%    Breastfeeding No    BMI 29.15 kg/m2     Current Shift:     Last three shifts:  04/11 1901 - 04/13 0700  In: 291.7   Out: -     Physical Exam:  General: WD, WN. Alert, cooperative, no acute distress    HEENT: NC, Atraumatic. PERRLA, anicteric sclerae. Lungs: CTA Bilaterally. No Wheezing/Rhonchi/Rales. Heart:  Regular  rhythm,  No murmur, No Rubs, No Gallops  Abdomen: Soft, Non distended, Non tender.  +Bowel sounds, palpable mass   Extremities: No c/c/e  Psych:   Not anxious or agitated. Neurologic:  No acute neurological deficit. Labs: Results:       Chemistry Recent Labs      04/12/18 2030   GLU  108*   NA  141   K  3.4*   CL  104   CO2  24   BUN  7   CREA  0.79   CA  8.9   AGAP  13   BUCR  9*   AP  77   TP  7.1   ALB  2.7*   GLOB  4.4*   AGRAT  0.6*      CBC w/Diff Recent Labs      04/13/18   0115  04/12/18 2030   WBC  9.3  10.1   RBC  1.38*  1.50*   HGB  4.2*  4.5*   HCT  13.4*  14.9*   PLT  262  428*   GRANS  85*  77*   LYMPH  8*  18*   EOS  1  1      Cardiac Enzymes Recent Labs      04/12/18 2030   CPK  33   CKND1  CALCULATION NOT PERFORMED WHEN RESULT IS BELOW LINEAR LIMIT      Coagulation Recent Labs      04/12/18 2030   PTP  12.8   INR  1.0   APTT  29.2       Lipid Panel No results found for: CHOL, CHOLPOCT, CHOLX, CHLST, CHOLV, 459584, HDL, LDL, LDLC, DLDLP, 442122, VLDLC, VLDL, TGLX, TRIGL, TRIGP, TGLPOCT, CHHD, CHHDX   BNP No results for input(s): BNPP in the last 72 hours.    Liver Enzymes Recent Labs      04/12/18 2030   TP  7.1   ALB  2.7*   AP  77   SGOT  14*      Thyroid Studies Lab Results   Component Value Date/Time    TSH 1.220 10/15/2017 03:49 AM        Procedures/imaging: see electronic medical records for all procedures/Xrays and details which were not copied into this note but were reviewed prior to creation of Melvin Hobbs MD

## 2018-04-13 NOTE — PROGRESS NOTES
1915 Assumed care of pt from [facility]. Pt is sitting upright in bed, eating dinner. She has PRBC infusing through PIV.

## 2018-04-13 NOTE — ROUTINE PROCESS
Bedside and Verbal shift change report given to CLARISSA Cooley RN (oncoming nurse) by KARYNA Jackson (offgoing nurse). Report included the following information SBAR, Kardex, Intake/Output and MAR.

## 2018-04-13 NOTE — H&P
History & Physical    Patient: Steve Quan MRN: [id number]  CSN: [id number]    YOB: 1985  Age: 29 y.o. Sex: female      DOA: 4/12/2018    Chief Complaint:   Chief Complaint   Patient presents with    Dizziness    Vaginal Bleeding          HPI:     Steve Quan is a 29 y.o.  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 Also had several faint episodes but was able to drive her self to ER    Bleeding has worsened over 3 weeks while pregnant was on Lovenox but has been back on Xarlto due to heavy bleeding she has stopped Shirlie Hane for last day  Her last dosage was over 24 hrs prior to admission     Past Medical History:   Diagnosis Date    Anemia     Anemia     History of blood clots 10/15/2017    Intestinal       Past Surgical History:   Procedure Laterality Date    HX DILATION AND CURETTAGE         History reviewed. No pertinent family history. Social History     Social History    Marital status: SINGLE     Spouse name: N/A    Number of children: N/A    Years of education: N/A     Social History Main Topics    Smoking status: Former Smoker    Smokeless tobacco: Never Used    Alcohol use No    Drug use: No    Sexual activity: Yes     Birth control/ protection: None     Other Topics Concern    None     Social History Narrative       Prior to Admission medications    Medication Sig Start Date End Date Taking? Authorizing Provider   ferrous sulfate 325 mg (65 mg iron) tablet Take 1 Tab by mouth daily (with breakfast). 10/24/17  Yes Edwin Jama MD   oxyCODONE-acetaminophen (PERCOCET) 5-325 mg per tablet Take 1 Tab by mouth every eight (8) hours as needed. Max Daily Amount: 3 Tabs.  10/23/17  Yes Edwin Jama MD   rivaroxaban (XARELTO) 15 mg (42)- 20 mg (9) DsPk Take one 15 mg tablet twice a day with food for the first 21 days. Then, take one 20 mg tablet once a day with food for 9 days. Indications: splenic and mesenteric vein thrombosis 10/23/17  Yes Kuldeep Mcclendon MD   omeprazole (PRILOSEC) 40 mg capsule Take 40 mg by mouth daily. Yes Earnest Chaidez MD       No Known Allergies      Review of Systems  GENERAL: Patient alert, awake and oriented times 3, able to communicate full sentences and not in distress. HEENT: No change in vision, no earache, tinnitus, sore throat or sinus congestion. NECK: No pain or stiffness. PULMONARY: +shortness of breath, cough or wheeze. Cardiovascular: no pnd or orthopnea, no CP  GASTROINTESTINAL:+abdominal pain, nausea, vomiting or diarrhea, melena or bright red blood per rectum. GENITOURINARY: No urinary frequency, urgency, hesitancy or dysuria. MUSCULOSKELETAL: No joint or muscle pain, no back pain, no recent trauma. DERMATOLOGIC: No rash, no itching, no lesions. ENDOCRINE: No polyuria, polydipsia, no heat or cold intolerance. No recent change in weight. HEMATOLOGICAL: +anemia or easy bruising or bleeding. NEUROLOGIC: No headache, seizures, numbness, tingling + weakness. Physical Exam:     Physical Exam:  Visit Vitals    BP (!) 124/92    Pulse (!) 125    Temp 99.2 °F (37.3 °C)    Resp 20    Ht 5' 4\" (1.626 m)    Wt 73.9 kg (163 lb)    LMP 10/11/2017    SpO2 100%    BMI 27.98 kg/m2      O2 Device: Room air    Temp (24hrs), Av.9 °F (37.2 °C), Min:97.4 °F (36.3 °C), Max:100.2 °F (37.9 °C)             General:  Alert, cooperative, no distress, appears stated age. Head: Normocephalic, without obvious abnormality, atraumatic. Eyes:  Conjunctivae/corneas clear. PERRL, EOMs intact. Nose: Nares normal. No drainage or sinus tenderness. Neck: Supple, symmetrical, trachea midline, no adenopathy, thyroid: no enlargement, no carotid bruit and no JVD. Lungs:   Clear to auscultation bilaterally.    Heart:  Tachycardia rate and rhythm, S1, S2 normal.     Abdomen: Soft, tender  Bowel sounds normal. Large palpable mass appears still pregnant uterus to umbillicus area    Extremities: Extremities normal, atraumatic, no cyanosis or edema. Pulses: 2+ and symmetric all extremities. Skin:  No rashes or lesions   Neurologic: AAOx3, No focal motor or sensory deficit. Labs Reviewed:  CT Results  (Last 48 hours)               04/12/18 2125  CTA CHEST W OR W WO CONT Final result    Impression:  IMPRESSION:       1. Suboptimal due to respiratory motion, cannot evaluate segmental and smaller   vessels, especially in the lower lobes. No large or central pulmonary arterial   filling defect. 2.  Left lower lobe linear atelectasis. 3. 7 mm right middle lobe pulmonary nodules, features suggesting probably a   benign pulmonary hamartoma. As a precaution, recommend follow-up CT imaging as   outlined below.       ========       Fleischner Society pulmonary nodule guidelines (revised 2017): Solid nodule 6-8 mm:   -Low risk for lung cancer: CT at 6-12 months, then consider CT at 18-24 months.   -High risk for lung cancer: CT at 6-12 months, then CT at 18-24 months. Narrative:  EXAM: CTA Chest       INDICATION: Status post childbirth 2 weeks ago (stillborn). Heavy bleeding since   then. Shortness of breath. History of clots. COMPARISON: None       TECHNIQUE: Helical volumetric scanning was performed from the thoracic inlet   through the diaphragm with nonionic intravenous contrast. Axial, and coronal and   sagittal MIP reconstructions were generated. One or more dose reduction   techniques were used on this CT: automated exposure control, adjustment of the   mAs and/or kVp according to patient's size, and iterative reconstruction   techniques.  The specific techniques utilized on this CT exam have been   documented in the patient's electronic medical record.       _______________       FINDINGS:       Angel Garcia QUALITY: Overall exam quality is suboptimal due to suboptimal   breath-holding. Pulmonary arterial enhancement is optimal.       PULMONARY ARTERIES: No gross large or central pulmonary arteries. Cannot   evaluate segmental and subsegmental pulmonary arteries optimally, particularly   in the lower lobes, due to respiratory motion. MEDIASTINUM: Normal heart size. No evidence of right heart strain. Aorta is   unremarkable. No pericardial effusion. LUNGS: Anterior right middle lobe subpleural nodule is present measuring about 7   mm, oval in shape with smooth borders, peripheral rim of hyperdensity, centrally   somewhat low density measuring -14 Hounsfield units. No other pulmonary nodules. No focal consolidation. Atelectatic band present in the anterior basal to   lateral basal left lower lobe. PLEURA: Normal.       AIRWAYS: Normal.       LYMPH NODES: No enlarged nodes. UPPER ABDOMEN: Within normal range for technique. OTHER: No acute or aggressive osseous abnormalities identified. _______________                 All lab results for the last 24 hours reviewed. Recent Results (from the past 24 hour(s))   CBC WITH AUTOMATED DIFF    Collection Time: 04/12/18  8:30 PM   Result Value Ref Range    WBC 10.1 4.6 - 13.2 K/uL    RBC 1.50 (L) 4.20 - 5.30 M/uL    HGB 4.5 (LL) 12.0 - 16.0 g/dL    HCT 14.9 (LL) 35.0 - 45.0 %    MCV 99.3 (H) 74.0 - 97.0 FL    MCH 30.0 24.0 - 34.0 PG    MCHC 30.2 (L) 31.0 - 37.0 g/dL    RDW 19.6 (H) 11.6 - 14.5 %    PLATELET 035 (H) 091 - 420 K/uL    MPV 8.7 (L) 9.2 - 11.8 FL    NEUTROPHILS 77 (H) 40 - 73 %    LYMPHOCYTES 18 (L) 21 - 52 %    MONOCYTES 4 3 - 10 %    EOSINOPHILS 1 0 - 5 %    BASOPHILS 0 0 - 2 %    ABS. NEUTROPHILS 7.8 1.8 - 8.0 K/UL    ABS. LYMPHOCYTES 1.8 0.9 - 3.6 K/UL    ABS. MONOCYTES 0.4 0.05 - 1.2 K/UL    ABS. EOSINOPHILS 0.1 0.0 - 0.4 K/UL    ABS.  BASOPHILS 0.0 0.0 - 0.06 K/UL    DF AUTOMATED      RBC COMMENTS POLYCHROMASIA  2+        RBC COMMENTS ANISOCYTOSIS  1+        RBC COMMENTS SPHEROCYTES  2+        RBC COMMENTS HYPOCHROMIA  1+       METABOLIC PANEL, COMPREHENSIVE    Collection Time: 04/12/18  8:30 PM   Result Value Ref Range    Sodium 141 136 - 145 mmol/L    Potassium 3.4 (L) 3.5 - 5.5 mmol/L    Chloride 104 100 - 108 mmol/L    CO2 24 21 - 32 mmol/L    Anion gap 13 3.0 - 18 mmol/L    Glucose 108 (H) 74 - 99 mg/dL    BUN 7 7.0 - 18 MG/DL    Creatinine 0.79 0.6 - 1.3 MG/DL    BUN/Creatinine ratio 9 (L) 12 - 20      GFR est AA >60 >60 ml/min/1.73m2    GFR est non-AA >60 >60 ml/min/1.73m2    Calcium 8.9 8.5 - 10.1 MG/DL    Bilirubin, total 0.3 0.2 - 1.0 MG/DL    ALT (SGPT) 13 13 - 56 U/L    AST (SGOT) 14 (L) 15 - 37 U/L    Alk.  phosphatase 77 45 - 117 U/L    Protein, total 7.1 6.4 - 8.2 g/dL    Albumin 2.7 (L) 3.4 - 5.0 g/dL    Globulin 4.4 (H) 2.0 - 4.0 g/dL    A-G Ratio 0.6 (L) 0.8 - 1.7     URINALYSIS W/ RFLX MICROSCOPIC    Collection Time: 04/12/18  8:30 PM   Result Value Ref Range    Color YELLOW      Appearance CLEAR      Specific gravity <1.005 (L) 1.005 - 1.030    pH (UA) >8.5 (H) 5.0 - 8.0    Protein NEGATIVE  NEG mg/dL    Glucose NEGATIVE  NEG mg/dL    Ketone NEGATIVE  NEG mg/dL    Bilirubin NEGATIVE  NEG      Blood LARGE (A) NEG      Urobilinogen 0.2 0.2 - 1.0 EU/dL    Nitrites NEGATIVE  NEG      Leukocyte Esterase SMALL (A) NEG     CARDIAC PANEL,(CK, CKMB & TROPONIN)    Collection Time: 04/12/18  8:30 PM   Result Value Ref Range    CK 33 26 - 192 U/L    CK - MB <1.0 <3.6 ng/ml    CK-MB Index  0.0 - 4.0 %     CALCULATION NOT PERFORMED WHEN RESULT IS BELOW LINEAR LIMIT    Troponin-I, Qt. <0.02 0.00 - 0.06 NG/ML   PROTHROMBIN TIME + INR    Collection Time: 04/12/18  8:30 PM   Result Value Ref Range    Prothrombin time 12.8 11.5 - 15.2 sec    INR 1.0 0.8 - 1.2     PTT    Collection Time: 04/12/18  8:30 PM   Result Value Ref Range    aPTT 29.2 23.0 - 36.4 SEC   URINE MICROSCOPIC ONLY    Collection Time: 04/12/18  8:30 PM   Result Value Ref Range    WBC 2 to 4 0 - 5 /hpf    RBC 15 to 20 0 - 5 /hpf    Epithelial cells FEW 0 - 5 /lpf    Bacteria FEW (A) NEG /hpf   TYPE & SCREEN    Collection Time: 04/12/18  8:33 PM   Result Value Ref Range    Crossmatch Expiration 04/15/2018     ABO/Rh(D) O POSITIVE     Antibody screen NEG     CALLED TO:       2 PRBCs READY [facility] RN ER AT 2146 ON [id number] BY Aristeo Randolph    CALLED TO: [facility] ER BY JAISON AT 0146,  BLOOD IS READY     Unit number [id number]     Blood component type  LR     Unit division 00     Status of unit ALLOCATED     Crossmatch result Compatible     Unit number [id number]     Blood component type  LR     Unit division 00     Status of unit ISSUED     Crossmatch result Compatible     Unit number [id number]     Blood component type  LR     Unit division 00     Status of unit ALLOCATED     Crossmatch result Compatible     Unit number [id number]     Blood component type  LR     Unit division 00     Status of unit ALLOCATED     Crossmatch result Compatible    EKG, 12 LEAD, INITIAL    Collection Time: 04/12/18  8:42 PM   Result Value Ref Range    Ventricular Rate 133 BPM    Atrial Rate 133 BPM    P-R Interval 104 ms    QRS Duration 70 ms    Q-T Interval 388 ms    QTC Calculation (Bezet) 577 ms    Calculated P Axis 20 degrees    Calculated R Axis 51 degrees    Calculated T Axis 50 degrees    Diagnosis       Sinus tachycardia with short OH  Nonspecific ST and T wave abnormality  Abnormal ECG  No previous ECGs available     TRANSFUSION REACTION    Collection Time: 04/12/18 11:00 PM   Result Value Ref Range    Unit Number [id number]     Clerical Errors NEG     Pre-txfusion hemolysis: NEG     Post-txfusion hemolysis: NEG     Blood bag hemolysis: NEG     Direct Andria,pre-txfusion NEG     Direct Andria,post-txfusion NEG     PATHOLOGIST INTERP: PENDING     Component Type PACKED CELLS     Blood type,post-txn O  POS       ABO/Rh(D),Pre-txfsn O  POS       and EKG    Procedures/imaging: see electronic medical records for all procedures/Xrays and details which were not copied into this note but were reviewed prior to creation of Plan      Assessment/Plan     Active Problems:    Vaginal bleeding (4/12/2018)  Consult Gyn  Hold Rupinder Cuff  ? Repeat d and c ian defer  Start tranexamic acid       Anemia (4/12/2018)  Type and cross 2 units and transfuse 2 units    Miscarriage (4/13/2018)  Follow hcg quant      Atelectasis (4/13/2018)  incourage incentive spirometry     Coagulation defect ([facility] [address] (4/13/2018)      Tachycardia (4/13/2018)  Fluids and   Blood     Pulmonary nodule (4/13/2018)  Reinforce following up as out patient      hx of Splenic, mesenteric , and Portal vein thrombosis with secondary ischemia    Plan :      DVT/GI Prophylaxis: SCD's    Discussed with patient at bedside about hospital admission and my plan care, who understood and agree with my plan care.     Salomón Boyd MD  4/13/2018 1:06 AM

## 2018-04-13 NOTE — CONSULTS
-[facility] Consult Note-    Assessment/Plan-  1) Mesenteric Vein thrombosis- Was receiving Xarelto since Oct 2017, Lovenox since pregnant. Back on Xarelto after D&C. Hold anticoagulation for now. PT/PTT nml (these are normal typically for anti-Xa agents)  2) Miscarriage- s/p D&C with Uterine bleeding/fibroid. Gyn Dr. Isabela Calderon. 3) Anemia- Uterine losses, + Fibroid and recent D&C. 2 unit PRBC transfusion for Hgb 4.2 g/dl. Goal Hgb 8.0 g/dl. CC: 28 yo AAF referred by Dr. Micha Kearney for Hx MVThrombosis receiving anticoagulation, s/p miscarriage and Uterine bleeding. HPI: 28 yo AAF with thrombosis (CT 10/14/2017) involving the splenic, superior mesenteric and portal veins. She has uterine myoma and some mesenteric lymphadenopathy followed by Hem/Onc, [facility] physician Dr. Marizol Vuong in [place], [place]. She had received Xarelto until pregnancy and has reived Lovenox since. Unfortunate miscarriage (stillbirth at 25 weeks) followed by D&C, but Vaginal bleeding x 2 weeks. + SOB, Dizziness. Usin 1 pad/hr.       Past History      Past Medical History:       Past Medical History:   Diagnosis Date    Anemia- MONALISA      Anemia      History of blood clots 10/15/2017     Intestinal         Past Surgical History:        Past Surgical History:   Procedure Laterality Date    HX DILATION AND CURETTAGE             Family History:  History reviewed. No pertinent family history.     Social History:       Social History   Substance Use Topics    Smoking status: Former Smoker    Smokeless tobacco: Never Used    Alcohol use No         Allergies:  No Known Allergies        Review of Systems   Review of Systems   Respiratory: Positive for shortness of breath. Cardiovascular: Negative for chest pain. Gastrointestinal: Negative for abdominal pain. Genitourinary: Positive for vaginal bleeding. Neurological: Positive for dizziness and light-headedness. All other systems reviewed and are negative.     Visit Vitals    BP 99/64 (BP 1 Location: Right arm, BP Patient Position: At rest)    Pulse 98    Temp 98.3 °F (36.8 °C)    Resp 18    Ht 5' 4\" (1.626 m)    Wt 77 kg (169 lb 12.8 oz)    LMP 10/11/2017    SpO2 96%    Breastfeeding No    BMI 29.15 kg/m2   . Date 04/12/18 0700 - 04/13/18 0659 04/13/18 0700 - 04/14/18 0659   Shift [id number] 1900-0659 24 Hour Total 1440-5429 8227-0431 24 Hour Total   I  N  T  A  K  E   Blood  291.7 291.7         Volume (TRANSFUSE PACKED RBC'S)  291.7 291.7         Volume (TRANSFUSE PACKED RBC'S)  0 0       Shift Total  (mL/kg)  291.7  (3.8) 291.7  (3.8)      O  U  T  P  U  T   Urine            Urine Occurrence(s)  1 x 1 x       Shift Total  (mL/kg)         NET  291.7 291.7      Weight (kg)  77 77 77 77 77     Physical Exam   Constitutional: She is oriented to person, place, and time. She appears well-developed and well-nourished. HENT:   Head: Normocephalic and atraumatic. Neck: Normal range of motion. Neck supple. Heart: Regular rhythm, normal heart sounds and intact distal pulses. Lungs: Effort normal and breath sounds normal. No respiratory distress. She has no wheezes. She has no rales. Abdominal: Soft. Bowel sounds are normal. She exhibits no distension. There is no tenderness. There is no rebound and no guarding. Extremities: No C/C/E  Neurological: She is alert and oriented to person, place, and time. Non-focal  Skin: Skin is warm and dry. Psychiatric: She has a normal mood and affect.  Judgment normal.     Labs:      CBC WITH AUTOMATED DIFF    Collection Time: 04/13/18  1:15 AM   Result Value Ref Range    WBC 9.3 4.6 - 13.2 K/uL    RBC 1.38 (L) 4.20 - 5.30 M/uL    HGB 4.2 (LL) 12.0 - 16.0 g/dL    HCT 13.4 (LL) 35.0 - 45.0 %    MCV 97.1 (H) 74.0 - 97.0 FL    MCH 30.4 24.0 - 34.0 PG    MCHC 31.3 31.0 - 37.0 g/dL    RDW 18.6 (H) 11.6 - 14.5 %    PLATELET 064 332 - 251 K/uL    MPV 7.9 (L) 9.2 - 11.8 FL    NEUTROPHILS 85 (H) 42 - 75 %    BAND NEUTROPHILS 2 0 - 5 % LYMPHOCYTES 8 (L) 20 - 51 %    MONOCYTES 4 2 - 9 %    EOSINOPHILS 1 0 - 5 %    BASOPHILS 0 0 - 3 %    NRBC 1.0 (H) 0  WBC    ABS. NEUTROPHILS 7.9 1.8 - 8.0 K/UL    ABS. LYMPHOCYTES 0.7 (L) 0.8 - 3.5 K/UL    ABS. MONOCYTES 0.4 0 - 1.0 K/UL    ABS. EOSINOPHILS 0.1 0.0 - 0.4 K/UL    ABS. BASOPHILS 0.0 0.0 - 0.1 K/UL    RBC COMMENTS ANISOCYTOSIS  2+        RBC COMMENTS MACROCYTOSIS  1+        RBC COMMENTS POLYCHROMASIA  1+        RBC COMMENTS HYPOCHROMIA  2+        RBC COMMENTS TEARDROP CELLS  1+        DF MANUAL         Current Facility-Administered Medications:     0.9% sodium chloride infusion, 125 mL/hr, IntraVENous, CONTINUOUS, Danyelle Jimenez MD, Last Rate: 125 mL/hr at 04/13/18 0211, 125 mL/hr at 04/13/18 0211    acetaminophen (TYLENOL) solution 650 mg, 650 mg, Oral, Q4H PRN, Danyelle Jimenez MD    HYDROcodone-acetaminophen (NORCO) 5-325 mg per tablet 1 Tab, 1 Tab, Oral, Q4H PRN, Danyelle Jimenez MD    diphenhydrAMINE (BENADRYL) injection 12.5 mg, 12.5 mg, IntraVENous, Q4H PRN, Danyelle Jimenez MD, 12.5 mg at 04/13/18 0156    ondansetron (ZOFRAN) injection 4 mg, 4 mg, IntraVENous, Q4H PRN, Danyelle Jimenez MD    0.9% sodium chloride infusion 250 mL, 250 mL, IntraVENous, PRN, Danyelle Jimenez MD    tranexamic acid (LYSTEDA) tablet 1,300 mg, 1,300 mg, Oral, TID, Danyelle Jimenez MD    0.9% sodium chloride infusion 250 mL, 250 mL, IntraVENous, PRN, AKBAR Hardwick. Yoandy Dacosta.  Tabitha Aguilar, [address] [place] [address]  [phone]

## 2018-04-13 NOTE — PROGRESS NOTES
Shift Progress Note:  Assumedcareofpatient in bed awake,nocomplaintsoffered. Receiving blood transfusion. Finished uneventfully and now on 2nd unit of blood VSS, no complaints offered.   Patient Vitals for the past 12 hrs:   Temp Pulse Resp BP SpO2   04/13/18 0615 98.7 °F (37.1 °C) 100 18 98/73 100 %   04/13/18 0545 98.6 °F (37 °C) 96 20 101/73 100 %   04/13/18 0532 98.6 °F (37 °C) 96 18 101/68 100 %   04/13/18 0430 99 °F (37.2 °C) (!) 101 18 106/75 100 %   04/13/18 0400 98.9 °F (37.2 °C) (!) 101 18 116/58 100 %   04/13/18 0300 98.5 °F (36.9 °C) (!) 110 20 118/73 100 %   04/13/18 0230 - (!) 114 21 113/73 100 %   04/13/18 0229 98.9 °F (37.2 °C) (!) 115 20 113/73 100 %   04/13/18 0215 - (!) 126 18 113/58 100 %   04/13/18 0200 98.7 °F (37.1 °C) (!) 119 18 112/66 100 %   04/13/18 0130 - (!) 108 17 112/66 100 %   04/13/18 0006 99.2 °F (37.3 °C) - - - -   04/13/18 0000 - (!) 125 20 (!) 124/92 100 %   04/12/18 2315 - (!) 135 17 (!) 153/91 100 %   04/12/18 2300 - (!) 139 16 161/79 100 %   04/12/18 2255 100.2 °F (37.9 °C) (!) 140 - 151/78 100 %   04/12/18 2234 97.4 °F (36.3 °C) (!) 138 18 138/87 100 %   04/12/18 2100 - (!) 142 21 141/86 100 %   04/12/18 2032 - (!) 158 17 (!) 135/91 100 %   04/12/18 2006 98.7 °F (37.1 °C) (!) 137 20 140/83 100 %

## 2018-04-13 NOTE — PROGRESS NOTES
conducted an initial consultation and Spiritual Assessment for Talib Leigh, who is a 29 y.o.,female. Patients Primary Language is: [place]. According to the patients EMR Oriental orthodox Affiliation is: [facility].     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 Explored issues of gillian, belief, spirituality and Alevism/ritual needs while hospitalized. Listened empathically. Provided chaplaincy education. Provided information about Spiritual Care Services. Offered assurance of prayer on patient's behalf. Chart reviewed. The following outcomes where achieved:  Patient shared limited information about both their medical narrative and spiritual journey/beliefs.  confirmed Patient's Oriental orthodox Affiliation. Patient processed feeling about current hospitalization. Patient expressed gratitude for 's visit. Assessment:   discovered this patient had recently experienced a Still birth during her 20th week of her pregnancy.  offered SHARE bereavement support information and booklet, however patient declined both stating her doctor has provided her with this information and her employer has provided  support. Patient does not have any Alevism/cultural needs that will affect patients preferences in health care. Plan:  Chaplains will continue to follow and will provide pastoral care on an as needed/requested basis.   recommends bedside caregivers page  on duty if patient shows signs of acute spiritual or emotional distress. Rev.  Clayton Elliott  [facility]  [phone]

## 2018-04-14 VITALS
HEIGHT: 64 IN | DIASTOLIC BLOOD PRESSURE: 89 MMHG | OXYGEN SATURATION: 99 % | HEART RATE: 96 BPM | TEMPERATURE: 97.8 F | RESPIRATION RATE: 16 BRPM | BODY MASS INDEX: 28.99 KG/M2 | SYSTOLIC BLOOD PRESSURE: 121 MMHG | WEIGHT: 169.8 LBS

## 2018-04-14 LAB
ANION GAP SERPL CALC-SCNC: 9 MMOL/L (ref 3–18)
ATRIAL RATE: 133 BPM
BASOPHILS # BLD: 0 K/UL (ref 0–0.06)
BASOPHILS NFR BLD: 0 % (ref 0–2)
BUN SERPL-MCNC: 6 MG/DL (ref 7–18)
BUN/CREAT SERPL: 9 (ref 12–20)
CALCIUM SERPL-MCNC: 7.7 MG/DL (ref 8.5–10.1)
CALCULATED P AXIS, ECG09: 20 DEGREES
CALCULATED R AXIS, ECG10: 51 DEGREES
CALCULATED T AXIS, ECG11: 50 DEGREES
CHLORIDE SERPL-SCNC: 109 MMOL/L (ref 100–108)
CO2 SERPL-SCNC: 25 MMOL/L (ref 21–32)
CREAT SERPL-MCNC: 0.64 MG/DL (ref 0.6–1.3)
DIAGNOSIS, 93000: NORMAL
DIFFERENTIAL METHOD BLD: ABNORMAL
EOSINOPHIL # BLD: 0.1 K/UL (ref 0–0.4)
EOSINOPHIL NFR BLD: 2 % (ref 0–5)
ERYTHROCYTE [DISTWIDTH] IN BLOOD BY AUTOMATED COUNT: 17.2 % (ref 11.6–14.5)
GLUCOSE SERPL-MCNC: 96 MG/DL (ref 74–99)
HCT VFR BLD AUTO: 26 % (ref 35–45)
HGB BLD-MCNC: 8.4 G/DL (ref 12–16)
INR PPP: 1.1 (ref 0.8–1.2)
LYMPHOCYTES # BLD: 1.3 K/UL (ref 0.9–3.6)
LYMPHOCYTES NFR BLD: 19 % (ref 21–52)
MAGNESIUM SERPL-MCNC: 1.9 MG/DL (ref 1.6–2.6)
MCH RBC QN AUTO: 29.5 PG (ref 24–34)
MCHC RBC AUTO-ENTMCNC: 32.3 G/DL (ref 31–37)
MCV RBC AUTO: 91.2 FL (ref 74–97)
MONOCYTES # BLD: 0.4 K/UL (ref 0.05–1.2)
MONOCYTES NFR BLD: 6 % (ref 3–10)
NEUTS SEG # BLD: 5.3 K/UL (ref 1.8–8)
NEUTS SEG NFR BLD: 73 % (ref 40–73)
P-R INTERVAL, ECG05: 104 MS
PLATELET # BLD AUTO: 288 K/UL (ref 135–420)
PMV BLD AUTO: 8.7 FL (ref 9.2–11.8)
POTASSIUM SERPL-SCNC: 3.9 MMOL/L (ref 3.5–5.5)
PROTHROMBIN TIME: 13.2 SEC (ref 11.5–15.2)
Q-T INTERVAL, ECG07: 388 MS
QRS DURATION, ECG06: 70 MS
QTC CALCULATION (BEZET), ECG08: 577 MS
RBC # BLD AUTO: 2.85 M/UL (ref 4.2–5.3)
SODIUM SERPL-SCNC: 143 MMOL/L (ref 136–145)
VENTRICULAR RATE, ECG03: 133 BPM
WBC # BLD AUTO: 7.2 K/UL (ref 4.6–13.2)

## 2018-04-14 PROCEDURE — 74011250636 HC RX REV CODE- 250/636: Performed by: INTERNAL MEDICINE

## 2018-04-14 PROCEDURE — 74011250637 HC RX REV CODE- 250/637: Performed by: INTERNAL MEDICINE

## 2018-04-14 PROCEDURE — 85025 COMPLETE CBC W/AUTO DIFF WBC: CPT | Performed by: INTERNAL MEDICINE

## 2018-04-14 PROCEDURE — 80048 BASIC METABOLIC PNL TOTAL CA: CPT | Performed by: INTERNAL MEDICINE

## 2018-04-14 PROCEDURE — 36415 COLL VENOUS BLD VENIPUNCTURE: CPT | Performed by: INTERNAL MEDICINE

## 2018-04-14 PROCEDURE — 83735 ASSAY OF MAGNESIUM: CPT | Performed by: INTERNAL MEDICINE

## 2018-04-14 PROCEDURE — 85610 PROTHROMBIN TIME: CPT | Performed by: INTERNAL MEDICINE

## 2018-04-14 RX ADMIN — SODIUM CHLORIDE 125 ML/HR: 900 INJECTION, SOLUTION INTRAVENOUS at 02:56

## 2018-04-14 RX ADMIN — OXYCODONE HYDROCHLORIDE AND ACETAMINOPHEN 2 TABLET: 10; 325 TABLET ORAL at 00:22

## 2018-04-14 NOTE — PROGRESS NOTES
1047  Assumed care for this pt. Pt is awake. alert, oriented x4. Lying in bed.    9:08 AM Resting in bed. Lungs are clear. Pt stated has occasional non productive cough. Abdomen soft with active BS. Pt still has slight reddish vaginal bleed spotting or  when voiding . Denies pain or nausea. IV infusing on left AC g 20. Pt has slight edema on left hand. 11:35 AM   Pt was seen by Dr Alek Bar with discharge orders. 1300   Discharge instructions given. IV removed. Dual AVS reviewed with JUAN Cardoza RN. All medications reviewed individually with patient. Opportunities for questions and concerns provided. Patient's arm band appropriately discarded. 1330  Pt discharged per wheelchair accompanied by family member.

## 2018-04-14 NOTE — PROGRESS NOTES
18: Assumed patient care, she was alert and oriented to person, place, time and situation. Respiratory status was stable on room air. Vital signs were stable. MEWS score was a one. Patient denied any nausea vomiting dizziness or anxiety. White board was updated and explained. Bed was locked and in lowest position. Call bell, water and personal belongings were within reach. Patient had no questions, comments or concerns after bedside shift report. 0700: Patient had an uneventful shift. Respiratory status, vital signs and MEWS score remained stable. Patient was resting quietly with no signs of distress noted. Bed locked and in lowest position. Call bell water and personal belongings were within reach. Patient had no questions, comments or concerns after bedside shift report.  Bedside report given to [facility] REDGAR

## 2018-04-14 NOTE — PROGRESS NOTES
-[facility] Progress Note-    Assessment/Plan-  1) Mesenteric Vein thrombosis- She was receiving Xarelto since Oct 2017, Lovenox since pregnant. Back on Xarelto after D&C. Hold anticoagulation for now. PT/PTT nml (these are normal typically for anti-Xa agents). Xarelto effect minimized now (> 48 hrs off med). Would hold the use of Lysteda given that it is Thrombogenic. Bleeding should improve off of Xarelto. She will see Dr. Luma Hillman (Hem/Onc) on Monday 4/16/2018) to discuss anti-coagulation therapy. 2) Miscarriage- s/p D&C with Uterine bleeding/fibroid. Gyn Dr. Eliane Otto. 3) Anemia- Uterine losses, + Fibroid and recent D&C. S/p 4 units PRBC transfusion for Hgb 4.2 g/dl, Hgb now 8.4 g/dl.       Review of Systems   Review of Systems   Respiratory: Positive for shortness of breath improved. Cardiovascular: Negative for chest pain. Gastrointestinal: Negative for abdominal pain. Genitourinary: Positive for vaginal bleeding improved. Neurological: Positive for dizziness and light-headedness resolved. All other systems reviewed and are negative. Visit Vitals    /63    Pulse 75    Temp 98.6 °F (37 °C)    Resp 16    Ht 5' 4\" (1.626 m)    Wt 77 kg (169 lb 12.8 oz)    LMP 10/11/2017    SpO2 97%    Breastfeeding No    BMI 29.15 kg/m2   . Date 04/13/18 0700 - 04/14/18 0659 04/14/18 0700 - 04/15/18 0659   Shift 8374-1062 9205-4897 24 Hour Total 7816-4884 9895-8258 24 Hour Total   I  N  T  A  K  E   P.O. 1180  1180         P. O. 1180  1180       Blood  310 310         Volume (TRANSFUSE PACKED RBC'S)  310 310       Shift Total  (mL/kg) 1180  (15.3) 310  (4) 1490  (19.3)      O  U  T  P  U  T   Urine  (mL/kg/hr) 1400  (1.5)  1400  (0.8)         Urine Voided 1400  1400         Urine Occurrence(s) 1 x 1 x 2 x       Stool            Stool Occurrence(s) 0 x  0 x       Shift Total  (mL/kg) 1400  (18.2)  1400  (18.2)      NET -220 310 90      Weight (kg) 77 77 77 77 77 77 Physical Exam   Constitutional: She is oriented to person, place, and time. She appears well-developed and well-nourished. HENT:   Head: Normocephalic and atraumatic. Neck: Normal range of motion. Neck supple. Heart: Regular rhythm, normal heart sounds and intact distal pulses. Lungs: Effort normal and breath sounds normal. No respiratory distress. She has no wheezes. She has no rales. Abdominal: Soft. Bowel sounds are normal. Mildly distended. There is no tenderness. There is no rebound and no guarding. Extremities: No C/C/E  Neurological: She is alert and oriented to person, place, and time. Non-focal  Skin: Skin is warm and dry. Psychiatric: She has a normal mood and affect. Judgment normal.     Labs:      CBC WITH AUTOMATED DIFF    Collection Time: 04/14/18  3:36 AM   Result Value Ref Range    WBC 7.2 4.6 - 13.2 K/uL    RBC 2.85 (L) 4.20 - 5.30 M/uL    HGB 8.4 (L) 12.0 - 16.0 g/dL    HCT 26.0 (L) 35.0 - 45.0 %    MCV 91.2 74.0 - 97.0 FL    MCH 29.5 24.0 - 34.0 PG    MCHC 32.3 31.0 - 37.0 g/dL    RDW 17.2 (H) 11.6 - 14.5 %    PLATELET 171 636 - 701 K/uL    MPV 8.7 (L) 9.2 - 11.8 FL    NEUTROPHILS 73 40 - 73 %    LYMPHOCYTES 19 (L) 21 - 52 %    MONOCYTES 6 3 - 10 %    EOSINOPHILS 2 0 - 5 %    BASOPHILS 0 0 - 2 %    ABS. NEUTROPHILS 5.3 1.8 - 8.0 K/UL    ABS. LYMPHOCYTES 1.3 0.9 - 3.6 K/UL    ABS. MONOCYTES 0.4 0.05 - 1.2 K/UL    ABS. EOSINOPHILS 0.1 0.0 - 0.4 K/UL    ABS.  BASOPHILS 0.0 0.0 - 0.06 K/UL    DF AUTOMATED         Current Facility-Administered Medications:     0.9% sodium chloride infusion, 125 mL/hr, IntraVENous, CONTINUOUS, Elenita Angulo MD, Last Rate: 125 mL/hr at 04/13/18 0211, 125 mL/hr at 04/13/18 0211    acetaminophen (TYLENOL) solution 650 mg, 650 mg, Oral, Q4H PRN, Elenita Angulo MD    HYDROcodone-acetaminophen (NORCO) 5-325 mg per tablet 1 Tab, 1 Tab, Oral, Q4H PRN, Elenita Angulo MD    diphenhydrAMINE (BENADRYL) injection 12.5 mg, 12.5 mg, IntraVENous, Q4H PRN, Chapin Og MD, 12.5 mg at 04/13/18 0156    ondansetron (ZOFRAN) injection 4 mg, 4 mg, IntraVENous, Q4H PRN, Chapin Og MD    0.9% sodium chloride infusion 250 mL, 250 mL, IntraVENous, PRN, Chapin Og MD    tranexamic acid (LYSTEDA) tablet 1,300 mg, 1,300 mg, Oral, TID, Chapin Og MD    0.9% sodium chloride infusion 250 mL, 250 mL, IntraVENous, PRN, AKBAR De La Rosa. Keaton Joyce.  Bhupinder Chen, [address] [place] [address]  [phone]

## 2018-04-14 NOTE — DISCHARGE INSTRUCTIONS
Anemia: Care Instructions  Your Care Instructions    Anemia is a low level of red blood cells, which carry oxygen throughout your body. Many things can cause anemia. Lack of iron is one of the most common causes. Your body needs iron to make hemoglobin, a substance in red blood cells that carries oxygen from the lungs to your body's cells. Without enough iron, the body produces fewer and smaller red blood cells. As a result, your body's cells do not get enough oxygen, and you feel tired and weak. And you may have trouble concentrating. Bleeding is the most common cause of a lack of iron. You may have heavy menstrual bleeding or bleeding caused by conditions such as ulcers, hemorrhoids, or cancer. Regular use of aspirin or other anti-inflammatory medicines (such as ibuprofen) also can cause bleeding in some people. A lack of iron in your diet also can cause anemia, especially at times when the body needs more iron, such as during pregnancy, infancy, and the teen years. Your doctor may have prescribed iron pills. It may take several months of treatment for your iron levels to return to normal. Your doctor also may suggest that you eat foods that are rich in iron, such as meat and beans. There are many other causes of anemia. It is not always due to a lack of iron. Finding the specific cause of your anemia will help your doctor find the right treatment for you. Follow-up care is a key part of your treatment and safety. Be sure to make and go to all appointments, and call your doctor if you are having problems. It's also a good idea to know your test results and keep a list of the medicines you take. How can you care for yourself at home? · Take your medicines exactly as prescribed. Call your doctor if you think you are having a problem with your medicine. · If your doctor recommends iron pills, take them as directed:  ¨ Try to take the pills on an empty stomach about 1 hour before or 2 hours after meals. But you may need to take iron with food to avoid an upset stomach. ¨ Do not take antacids or drink milk or caffeine drinks (such as coffee, tea, or cola) at the same time or within 2 hours of the time that you take your iron. They can make it hard for your body to absorb the iron. ¨ Vitamin C (from food or supplements) helps your body absorb iron. Try taking iron pills with a glass of orange juice or some other food that is high in vitamin C, such as citrus fruits. ¨ Iron pills may cause stomach problems, such as heartburn, nausea, diarrhea, constipation, and cramps. Be sure to drink plenty of fluids, and include fruits, vegetables, and fiber in your diet each day. Iron pills often make your bowel movements dark or green. ¨ If you forget to take an iron pill, do not take a double dose of iron the next time you take a pill. ¨ Keep iron pills out of the reach of small children. An overdose of iron can be very dangerous. · Follow your doctor's advice about eating iron-rich foods. These include red meat, shellfish, poultry, eggs, beans, raisins, whole-grain bread, and leafy green vegetables. · Steam vegetables to help them keep their iron content. When should you call for help? Call 911 anytime you think you may need emergency care. For example, call if:  ? · You have symptoms of a heart attack. These may include:  ¨ Chest pain or pressure, or a strange feeling in the chest.  ¨ Sweating. ¨ Shortness of breath. ¨ Nausea or vomiting. ¨ Pain, pressure, or a strange feeling in the back, neck, jaw, or upper belly or in one or both shoulders or arms. ¨ Lightheadedness or sudden weakness. ¨ A fast or irregular heartbeat. After you call 911, the  may tell you to chew 1 adult-strength or 2 to 4 low-dose aspirin. Wait for an ambulance. Do not try to drive yourself. ? · You passed out (lost consciousness).    ?Call your doctor now or seek immediate medical care if:  ? · You have new or increased shortness of breath. ? · You are dizzy or lightheaded, or you feel like you may faint. ? · Your fatigue and weakness continue or get worse. ? · You have any abnormal bleeding, such as:  ¨ Nosebleeds. ¨ Vaginal bleeding that is different (heavier, more frequent, at a different time of the month) than what you are used to. ¨ Bloody or black stools, or rectal bleeding. ¨ Bloody or pink urine. ? Watch closely for changes in your health, and be sure to contact your doctor if:  ? · You do not get better as expected. Where can you learn more? Go to http://darian-marcus.info/. Enter R301 in the search box to learn more about \"Anemia: Care Instructions. \"  Current as of: October 13, 2016  Content Version: 11.4  © 8184-9279 [employer]. Care instructions adapted under license by [employer] (which disclaims liability or warranty for this information). If you have questions about a medical condition or this instruction, always ask your healthcare professional. [place] [address] any warranty or liability for your use of this information.

## 2018-04-14 NOTE — DISCHARGE SUMMARY
Discharge Summary    Patient: Hermann Chase MRN: [id number]  CSN: [id number]    YOB: 1985  Age: 29 y.o. Sex: female    DOA: 4/12/2018 LOS:  LOS: 2 days   Discharge Date:      Primary Care Provider:  None    Admission Diagnoses: Anemia  Vaginal bleeding  Vaginal bleeding  Coagulation defect ([facility]  Tachycardia  Pulmonary nodule  Atelectasis  Miscarriage    Discharge Diagnoses:    Hospital Problems  Date Reviewed: 4/13/2018          Codes Class Noted POA    Miscarriage ICD-10-CM: O03.9  ICD-9-CM: 634.90  4/13/2018 Unknown        Atelectasis ICD-10-CM: J98.11  ICD-9-CM: 518.0  4/13/2018 Unknown        Coagulation defect ([facility] ICD-10-CM: D68.9  ICD-9-CM: 286.9  4/13/2018 Unknown        Tachycardia ICD-10-CM: R00.0  ICD-9-CM: 785.0  4/13/2018 Unknown        Pulmonary nodule ICD-10-CM: R91.1  ICD-9-CM: 793.11  4/13/2018 Unknown        Vaginal bleeding ICD-10-CM: N93.9  ICD-9-CM: 623.8  4/12/2018 Unknown        * (Principal)Anemia ICD-10-CM: D64.9  ICD-9-CM: 285.9  4/12/2018 Unknown        Portal vein thrombosis ICD-10-CM: X12  ICD-9-CM: 458  10/14/2017 Yes        Mesenteric vein thrombosis ICD-10-CM: D84  ICD-9-CM: 557.0  10/14/2017 Yes              Discharge Condition: Stable    Discharge Medications:     Current Discharge Medication List      CONTINUE these medications which have NOT CHANGED    Details   ferrous sulfate 325 mg (65 mg iron) tablet Take 1 Tab by mouth daily (with breakfast). Qty: 30 Tab, Refills: 0      oxyCODONE-acetaminophen (PERCOCET) 5-325 mg per tablet Take 1 Tab by mouth every eight (8) hours as needed. Max Daily Amount: 3 Tabs. Qty: 15 Tab, Refills: 0      omeprazole (PRILOSEC) 40 mg capsule Take 40 mg by mouth daily.          STOP taking these medications       rivaroxaban (XARELTO) 15 mg (42)- 20 mg (9) DsPk Comments:   Reason for Stopping:               Procedures : none     Consults: Gynecology and Hematology/Oncology      PHYSICAL EXAM   Visit Vitals    /70 (BP 1 Location: Right arm, BP Patient Position: At rest)    Pulse 88    Temp 98.4 °F (36.9 °C)    Resp 16    Ht 5' 4\" (1.626 m)    Wt 77 kg (169 lb 12.8 oz)    SpO2 100%    Breastfeeding No    BMI 29.15 kg/m2     General: Awake, cooperative, no acute distress    HEENT: NC, Atraumatic. PERRLA, EOMI. Anicteric sclerae. Lungs:  CTA Bilaterally. No Wheezing/Rhonchi/Rales. Heart:  Regular  rhythm,  No murmur, No Rubs, No Gallops  Abdomen: Soft, Non distended, Non tender. +Bowel sounds, abdomen mass   Extremities: No c/c/e  Psych:   Not anxious or agitated. Neurologic:  No acute neurological deficits. Admission HPI :   Benton Lang is a 29 y.o.  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 Also had several faint episodes but was able to drive her self to ER    Bleeding has worsened over 3 weeks while pregnant was on Lovenox but has been back on Xarlto due to heavy bleeding she has stopped Jacqueline Areola for last day  Her last dosage was over 24 hrs prior to admission     Hospital Course :   Since she was admitted, [place] was hold due to vaginal bleeding. Hematology and gyn were on board, she received 4 units prbc for her severe anemia-Acute blood loss anemia in setting of vaginal bleeding /fibroids. She will f/u with hematology on Monday for anticoagulant medicine and f/u with her gyn for fibrosis. xarelto was hold on discharge.      Activity: Activity as tolerated    Diet: Regular Diet    Follow-up: pcm , Dr. Amauri Rangel -tsering hemo/onc, GYN     Disposition: home     Minutes spent on discharge: 35 min       Labs: Results:       Chemistry Recent Labs      04/14/18   0336  04/12/18   2030   GLU  96  108*   NA  143  141   K  3.9  3.4*   CL  109*  104   CO2  25  24   BUN  6*  7   CREA  0.64  0.79   CA  7.7* 8. 9   AGAP  9  13   BUCR  9*  9*   AP   --   77   TP   --   7.1   ALB   --   2.7*   GLOB   --   4.4*   AGRAT   --   0.6*      CBC w/Diff Recent Labs      04/14/18 0336 04/13/18   1012  04/13/18   0115  04/12/18 2030   WBC  7.2   --   9.3  10.1   RBC  2.85*   --   1.38*  1.50*   HGB  8.4*  6.9*  4.2*  4.5*   HCT  26.0*  21.0*  13.4*  14.9*   PLT  288   --   262  428*   GRANS  73   --   85*  77*   LYMPH  19*   --   8*  18*   EOS  2   --   1  1      Cardiac Enzymes Recent Labs      04/12/18 2030   CPK  33   CKND1  CALCULATION NOT PERFORMED WHEN RESULT IS BELOW LINEAR LIMIT      Coagulation Recent Labs      04/14/18 0336 04/12/18 2030   PTP  13.2  12.8   INR  1.1  1.0   APTT   --   29.2       Lipid Panel No results found for: CHOL, CHOLPOCT, CHOLX, CHLST, CHOLV, 398392, HDL, LDL, LDLC, DLDLP, 574954, VLDLC, VLDL, TGLX, TRIGL, TRIGP, TGLPOCT, CHHD, CHHDX   BNP No results for input(s): BNPP in the last 72 hours. Liver Enzymes Recent Labs      04/12/18 2030   TP  7.1   ALB  2.7*   AP  77   SGOT  14*      Thyroid Studies Lab Results   Component Value Date/Time    TSH 1.220 10/15/2017 03:49 AM            Significant Diagnostic Studies: Cta Chest W Or W Wo Cont    Result Date: 4/12/2018  EXAM: CTA Chest INDICATION: Status post childbirth 2 weeks ago (stillborn). Heavy bleeding since then. Shortness of breath. History of clots. COMPARISON: None TECHNIQUE: Helical volumetric scanning was performed from the thoracic inlet through the diaphragm with nonionic intravenous contrast. Axial, and coronal and sagittal MIP reconstructions were generated. One or more dose reduction techniques were used on this CT: automated exposure control, adjustment of the mAs and/or kVp according to patient's size, and iterative reconstruction techniques.  The specific techniques utilized on this CT exam have been documented in the patient's electronic medical record. _______________ FINDINGS: EXAM QUALITY: Overall exam quality is suboptimal due to suboptimal breath-holding. Pulmonary arterial enhancement is optimal. PULMONARY ARTERIES: No gross large or central pulmonary arteries. Cannot evaluate segmental and subsegmental pulmonary arteries optimally, particularly in the lower lobes, due to respiratory motion. MEDIASTINUM: Normal heart size. No evidence of right heart strain. Aorta is unremarkable. No pericardial effusion. LUNGS: Anterior right middle lobe subpleural nodule is present measuring about 7 mm, oval in shape with smooth borders, peripheral rim of hyperdensity, centrally somewhat low density measuring -14 Hounsfield units. No other pulmonary nodules. No focal consolidation. Atelectatic band present in the anterior basal to lateral basal left lower lobe. PLEURA: Normal. AIRWAYS: Normal. LYMPH NODES: No enlarged nodes. UPPER ABDOMEN: Within normal range for technique. OTHER: No acute or aggressive osseous abnormalities identified. _______________     IMPRESSION: 1. Suboptimal due to respiratory motion, cannot evaluate segmental and smaller vessels, especially in the lower lobes. No large or central pulmonary arterial filling defect. 2.  Left lower lobe linear atelectasis. 3. 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 As a precaution, recommend follow-up CT imaging as outlined below. ======== Fleischner Society pulmonary nodule guidelines (revised 2017): Solid nodule 6-8 mm: -Low risk for lung cancer: CT at 6-12 months, then consider CT at 18-24 months. -High risk for lung cancer: CT at 6-12 months, then CT at 18-24 months. Us Transvaginal    Result Date: 2018  EXAM: Endovaginal pelvic ultrasound INDICATION: History of spontaneous . Heavy vaginal bleeding. History of large uterine fibroid.  COMPARISON: Correlation is with reports of previous CT scans, and an MRI of the pelvis, imaging is not available. _______________ FINDINGS: Endovaginal scanning was performed including Doppler. In addition, transabdominal scanning was performed as well. Uterus is prominent measuring 15.0 x 14.2 x 17.0 cm. A heterogeneous rounded mass occupies much of the mid to lateral right uterus, transmural, with solid and anechoic/cystic components. This structure measures 15.3 x 12.4 x 11.5 cm. The endometrial stripe is not discernible. No intrauterine sac like structure. Right ovary measures 1.8 x 2.0 x 3.3 cm, left ovary measures 4.2 x 2.4 x 2.5 cm and both appear normal with normal Doppler characteristics. No adnexal masses or free fluid. _______________     IMPRESSION: Large partially necrotic uterine fibroid. No discernible endometrial stripe, retained products of conception are not likely. Xr Chest Port    Result Date: 4/13/2018  --------------------------------------------------------------------------- <<<<<<<<<           [facility]           >>>>>>>>> --------------------------------------------------------------------------- CLINICAL HISTORY:  Shortness of breath. COMPARISON EXAMINATIONS:  None. ---  SINGLE FRONTAL VIEW OF THE CHEST  --- The lungs and pleural spaces are clear. The mediastinum is unremarkable in appearance. No significant osseous abnormalities are identified.  --------------    Impression: -------------- No active pulmonary disease.             [facility]     CC: None

## 2018-04-14 NOTE — PROGRESS NOTES
Bedside and Verbal shift change report given to Aylin Sloan (oncoming nurse) by Angel Wilburn RN (offgoing nurse). Report included the following information SBAR, Procedure Summary, Intake/Output, MAR and Recent Results.

## 2018-04-16 LAB
ABO + RH BLD: NORMAL
BLD PROD TYP BPU: NORMAL
BLOOD GROUP ANTIBODIES SERPL: NORMAL
BPU ID: NORMAL
CALLED TO:,BCALL1: NORMAL
CALLED TO:,BCALL2: NORMAL
CALLED TO:,BCALL3: NORMAL
CROSSMATCH RESULT,%XM: NORMAL
SPECIMEN EXP DATE BLD: NORMAL
STATUS OF UNIT,%ST: NORMAL
UNIT DIVISION, %UDIV: 0

## 2018-05-12 ENCOUNTER — APPOINTMENT (OUTPATIENT)
Dept: GENERAL RADIOLOGY | Age: 33
DRG: 308 | End: 2018-05-12
Attending: EMERGENCY MEDICINE
Payer: COMMERCIAL

## 2018-05-12 ENCOUNTER — HOSPITAL ENCOUNTER (INPATIENT)
Age: 33
LOS: 1 days | Discharge: HOME | DRG: 308 | End: 2018-05-14
Attending: EMERGENCY MEDICINE | Admitting: FAMILY MEDICINE
Payer: COMMERCIAL

## 2018-05-12 DIAGNOSIS — D64.9 CHRONIC ANEMIA: ICD-10-CM

## 2018-05-12 DIAGNOSIS — I49.8 OTHER CARDIAC ARRHYTHMIA: ICD-10-CM

## 2018-05-12 DIAGNOSIS — R07.89 OTHER CHEST PAIN: ICD-10-CM

## 2018-05-12 DIAGNOSIS — E87.6 HYPOKALEMIA: ICD-10-CM

## 2018-05-12 DIAGNOSIS — R06.02 SOB (SHORTNESS OF BREATH): Primary | ICD-10-CM

## 2018-05-12 PROBLEM — R07.9 CHEST PAIN: Status: ACTIVE | Noted: 2018-05-12

## 2018-05-12 PROBLEM — I47.1 SVT (SUPRAVENTRICULAR TACHYCARDIA) (HCC): Status: ACTIVE | Noted: 2018-05-12

## 2018-05-12 LAB
ALBUMIN SERPL-MCNC: 3.1 G/DL (ref 3.4–5)
ALBUMIN/GLOB SERPL: 0.8 {RATIO} (ref 0.8–1.7)
ALP SERPL-CCNC: 63 U/L (ref 45–117)
ALT SERPL-CCNC: 19 U/L (ref 13–56)
ANION GAP SERPL CALC-SCNC: 9 MMOL/L (ref 3–18)
AST SERPL-CCNC: 20 U/L (ref 15–37)
ATRIAL RATE: 103 BPM
BASOPHILS # BLD: 0.1 K/UL (ref 0–0.06)
BASOPHILS NFR BLD: 1 % (ref 0–2)
BILIRUB SERPL-MCNC: 0.4 MG/DL (ref 0.2–1)
BUN SERPL-MCNC: 9 MG/DL (ref 7–18)
BUN/CREAT SERPL: 11 (ref 12–20)
CALCIUM SERPL-MCNC: 8.6 MG/DL (ref 8.5–10.1)
CALCULATED P AXIS, ECG09: 49 DEGREES
CALCULATED R AXIS, ECG10: 52 DEGREES
CALCULATED T AXIS, ECG11: 25 DEGREES
CHLORIDE SERPL-SCNC: 108 MMOL/L (ref 100–108)
CK MB CFR SERPL CALC: NORMAL % (ref 0–4)
CK MB SERPL-MCNC: <1 NG/ML (ref 5–25)
CK SERPL-CCNC: 44 U/L (ref 26–192)
CO2 SERPL-SCNC: 24 MMOL/L (ref 21–32)
CREAT SERPL-MCNC: 0.81 MG/DL (ref 0.6–1.3)
D DIMER PPP FEU-MCNC: 0.85 UG/ML(FEU)
DIAGNOSIS, 93000: NORMAL
DIFFERENTIAL METHOD BLD: ABNORMAL
EOSINOPHIL # BLD: 0.1 K/UL (ref 0–0.4)
EOSINOPHIL NFR BLD: 1 % (ref 0–5)
ERYTHROCYTE [DISTWIDTH] IN BLOOD BY AUTOMATED COUNT: 18.5 % (ref 11.6–14.5)
GLOBULIN SER CALC-MCNC: 3.7 G/DL (ref 2–4)
GLUCOSE SERPL-MCNC: 104 MG/DL (ref 74–99)
HCG UR QL: NEGATIVE
HCT VFR BLD AUTO: 23 % (ref 35–45)
HGB BLD-MCNC: 7.3 G/DL (ref 12–16)
LYMPHOCYTES # BLD: 1.7 K/UL (ref 0.9–3.6)
LYMPHOCYTES NFR BLD: 25 % (ref 21–52)
MAGNESIUM SERPL-MCNC: 1.6 MG/DL (ref 1.6–2.6)
MCH RBC QN AUTO: 31.1 PG (ref 24–34)
MCHC RBC AUTO-ENTMCNC: 31.7 G/DL (ref 31–37)
MCV RBC AUTO: 97.9 FL (ref 74–97)
MONOCYTES # BLD: 0.3 K/UL (ref 0.05–1.2)
MONOCYTES NFR BLD: 5 % (ref 3–10)
NEUTS SEG # BLD: 4.4 K/UL (ref 1.8–8)
NEUTS SEG NFR BLD: 68 % (ref 40–73)
P-R INTERVAL, ECG05: 144 MS
PHOSPHATE SERPL-MCNC: 3.3 MG/DL (ref 2.5–4.9)
PLATELET # BLD AUTO: 235 K/UL (ref 135–420)
PMV BLD AUTO: 8.7 FL (ref 9.2–11.8)
POTASSIUM SERPL-SCNC: 3.2 MMOL/L (ref 3.5–5.5)
PROT SERPL-MCNC: 6.8 G/DL (ref 6.4–8.2)
Q-T INTERVAL, ECG07: 350 MS
QRS DURATION, ECG06: 82 MS
QTC CALCULATION (BEZET), ECG08: 458 MS
RBC # BLD AUTO: 2.35 M/UL (ref 4.2–5.3)
RBC MORPH BLD: ABNORMAL
SODIUM SERPL-SCNC: 141 MMOL/L (ref 136–145)
TROPONIN I SERPL-MCNC: <0.02 NG/ML (ref 0–0.06)
VENTRICULAR RATE, ECG03: 103 BPM
WBC # BLD AUTO: 6.6 K/UL (ref 4.6–13.2)

## 2018-05-12 PROCEDURE — 80053 COMPREHEN METABOLIC PANEL: CPT | Performed by: EMERGENCY MEDICINE

## 2018-05-12 PROCEDURE — 93970 EXTREMITY STUDY: CPT

## 2018-05-12 PROCEDURE — 85025 COMPLETE CBC W/AUTO DIFF WBC: CPT | Performed by: EMERGENCY MEDICINE

## 2018-05-12 PROCEDURE — 84439 ASSAY OF FREE THYROXINE: CPT | Performed by: INTERNAL MEDICINE

## 2018-05-12 PROCEDURE — 74011250637 HC RX REV CODE- 250/637: Performed by: EMERGENCY MEDICINE

## 2018-05-12 PROCEDURE — 84100 ASSAY OF PHOSPHORUS: CPT | Performed by: INTERNAL MEDICINE

## 2018-05-12 PROCEDURE — 85379 FIBRIN DEGRADATION QUANT: CPT | Performed by: EMERGENCY MEDICINE

## 2018-05-12 PROCEDURE — 83735 ASSAY OF MAGNESIUM: CPT | Performed by: EMERGENCY MEDICINE

## 2018-05-12 PROCEDURE — 99285 EMERGENCY DEPT VISIT HI MDM: CPT

## 2018-05-12 PROCEDURE — 81025 URINE PREGNANCY TEST: CPT

## 2018-05-12 PROCEDURE — 93005 ELECTROCARDIOGRAM TRACING: CPT

## 2018-05-12 PROCEDURE — 84443 ASSAY THYROID STIM HORMONE: CPT | Performed by: INTERNAL MEDICINE

## 2018-05-12 PROCEDURE — 71045 X-RAY EXAM CHEST 1 VIEW: CPT

## 2018-05-12 PROCEDURE — 82550 ASSAY OF CK (CPK): CPT | Performed by: EMERGENCY MEDICINE

## 2018-05-12 PROCEDURE — 74011250636 HC RX REV CODE- 250/636: Performed by: EMERGENCY MEDICINE

## 2018-05-12 RX ORDER — ACETAMINOPHEN 325 MG/1
650 TABLET ORAL
Status: DISCONTINUED | OUTPATIENT
Start: 2018-05-12 | End: 2018-05-14 | Stop reason: HOSPADM

## 2018-05-12 RX ORDER — METOPROLOL TARTRATE 5 MG/5ML
5 INJECTION INTRAVENOUS
Status: DISCONTINUED | OUTPATIENT
Start: 2018-05-12 | End: 2018-05-14 | Stop reason: HOSPADM

## 2018-05-12 RX ORDER — METOPROLOL TARTRATE 25 MG/1
25 TABLET, FILM COATED ORAL
Status: COMPLETED | OUTPATIENT
Start: 2018-05-12 | End: 2018-05-12

## 2018-05-12 RX ORDER — POTASSIUM CHLORIDE 20 MEQ/1
40 TABLET, EXTENDED RELEASE ORAL
Status: COMPLETED | OUTPATIENT
Start: 2018-05-12 | End: 2018-05-12

## 2018-05-12 RX ADMIN — METOPROLOL TARTRATE 25 MG: 25 TABLET ORAL at 23:51

## 2018-05-12 RX ADMIN — POTASSIUM CHLORIDE 40 MEQ: 20 TABLET, EXTENDED RELEASE ORAL at 22:41

## 2018-05-12 RX ADMIN — SODIUM CHLORIDE 1000 ML: 900 INJECTION, SOLUTION INTRAVENOUS at 20:27

## 2018-05-12 RX ADMIN — RIVAROXABAN 10 MG: 10 TABLET, FILM COATED ORAL at 23:51

## 2018-05-12 NOTE — IP AVS SNAPSHOT
Summary of Care Report The Summary of Care report has been created to help improve care coordination. Users with access to "[employer] or [address] (Web-based application) may access additional patient information including the Discharge Summary. If you are not currently a [address] user and need more information, please call the number listed below in the [place] [address] section and ask to be connected with Medical Records. Facility Information Name Address Phone [address] [address] [phone] Patient Information Patient Name Sex BECKI Marcos ([id number]) Female 1985 Discharge Information Admitting Provider Service Area Unit Agustín Ulrich MD / [facility] / [phone] Discharge Provider Discharge Date/Time Discharge Disposition Destination (none) 2018 (Pending) AHR (none) Patient Language Language ENGLISH [13] Hospital Problems as of 2018  Reviewed: 2018  2:06 PM by Agustín Ulrich MD  
  
  
  
 Class Noted - Resolved Last Modified POA Active Problems Portal vein thrombosis  10/14/2017 - Present 2018 by Agustín Ulrich MD Yes Entered by Munira Munson MD  
  Anemia  2018 - Present 2018 by Agustín Ulrich MD Yes Entered by AKBAR Gaona Chest pain  2018 - Present 2018 by David High MD Unknown Entered by David High MD  
  * (Principal)SVT (supraventricular tachycardia) ([facility]  2018 - Present 2018 by Gisela Schaeffer DO Unknown Entered by Gisela Schaeffer DO Non-Hospital Problems as of 2018  Reviewed: 2018  2:06 PM by Agustín Ulrich MD  
  
  
  
 Class Noted - Resolved Last Modified Active Problems   Abdominal pain  10/14/2017 - Present 10/14/2017 by uMnira Munson MD  
 Entered by Aaron Avery MD  
  Pleural effusion  10/14/2017 - Present 10/23/2017 by Baldemar Arguello MD  
  Entered by Aaron Avery MD  
  Mesenteric vein thrombosis  10/14/2017 - Present 4/13/2018 by Monica Murry MD  
  Entered by Aaron Avery MD  
  Vaginal bleeding  4/12/2018 - Present 4/12/2018 by AKBAR Ulloa Entered by Ankur Dhaliwal PA Miscarriage  4/13/2018 - Present 4/13/2018 by Monica Murry MD  
  Entered by Monica Murry MD  
  Atelectasis  4/13/2018 - Present 4/13/2018 by Monica Murry MD  
  Entered by Monica Murry MD  
  Coagulation defect [facility])  4/13/2018 - Present 4/13/2018 by Monica Murry MD  
  Entered by Monica Murry MD  
  Tachycardia  4/13/2018 - Present 4/13/2018 by Monica Murry MD  
  Entered by Monica Murry MD  
  Pulmonary nodule  4/13/2018 - Present 4/13/2018 by Monica Murry MD  
  Entered by Monica Murry MD  
  
You are allergic to the following No active allergies Current Discharge Medication List  
  
START taking these medications Dose & Instructions Dispensing Information Comments  
 metoprolol succinate 25 mg XL tablet Commonly known as:  TOPROL-XL Start taking on:  5/15/2018 Dose:  25 mg Take 1 Tab by mouth daily. Quantity:  30 Tab Refills:  0 CONTINUE these medications which have CHANGED Dose & Instructions Dispensing Information Comments  
 ferrous sulfate 325 mg (65 mg iron) tablet What changed:  when to take this Dose:  325 mg Take 1 Tab by mouth two (2) times daily (with meals). Quantity:  60 Tab Refills:  0  
   
 rivaroxaban 20 mg Tab tablet Commonly known as:  Teresa Bergman What changed:   
- medication strength 
- how much to take - when to take this Dose:  20 mg Take 1 Tab by mouth daily (with breakfast). Indications: blood clots Quantity:  30 Tab Refills:  0 CONTINUE these medications which have NOT CHANGED Dose & Instructions Dispensing Information Comments  
 oxyCODONE-acetaminophen 5-325 mg per tablet Commonly known as:  PERCOCET Dose:  1 Tab Take 1 Tab by mouth every eight (8) hours as needed. Max Daily Amount: 3 Tabs. Quantity:  15 Tab Refills:  0 PriLOSEC 40 mg capsule Generic drug:  omeprazole Dose:  40 mg Take 40 mg by mouth daily. Refills:  0 Follow-up Information Follow up With Details Comments Contact Info Jose Antonio Bacon II, DO  Pt already has an appointment scheduled with this physician. [address] [facility] 
[phone] Lesvia Mars MD   [address] [facility] [address] [facility] 
[phone] MD Gabbi Erazo [address] [phone] [address] [place] [address] 
[phone] Discharge Instructions DISCHARGE SUMMARY from Nurse PATIENT INSTRUCTIONS: 
 
After general anesthesia or intravenous sedation, for 24 hours or while taking prescription Narcotics: · Limit your activities · Do not drive and operate hazardous machinery · Do not make important personal or business decisions · Do  not drink alcoholic beverages · If you have not urinated within 8 hours after discharge, please contact your surgeon on call. Report the following to your surgeon: 
· Excessive pain, swelling, redness or odor of or around the surgical area · Temperature over 100.5 · Nausea and vomiting lasting longer than 4 hours or if unable to take medications · Any signs of decreased circulation or nerve impairment to extremity: change in color, persistent  numbness, tingling, coldness or increase pain · Any questions What to do at Home: 
Recommended activity: Activity as tolerated *  Please give a list of your current medications to your Primary Care Provider.  
 
*  Please update this list whenever your medications are discontinued, doses are 
 changed, or new medications (including over-the-counter products) are added. *  Please carry medication information at all times in case of emergency situations. These are general instructions for a healthy lifestyle: No smoking/ No tobacco products/ Avoid exposure to second hand smoke Surgeon General's Warning:  Quitting smoking now greatly reduces serious risk to your health. Obesity, smoking, and sedentary lifestyle greatly increases your risk for illness A healthy diet, regular physical exercise & weight monitoring are important for maintaining a healthy lifestyle You may be retaining fluid if you have a history of heart failure or if you experience any of the following symptoms:  Weight gain of 3 pounds or more overnight or 5 pounds in a week, increased swelling in our hands or feet or shortness of breath while lying flat in bed. Please call your doctor as soon as you notice any of these symptoms; do not wait until your next office visit. Recognize signs and symptoms of STROKE: 
 
F-face looks uneven A-arms unable to move or move unevenly S-speech slurred or non-existent T-time-call 911 as soon as signs and symptoms begin-DO NOT go Back to bed or wait to see if you get better-TIME IS BRAIN. Warning Signs of HEART ATTACK Call 911 if you have these symptoms: 
? Chest discomfort. Most heart attacks involve discomfort in the center of the chest that lasts more than a few minutes, or that goes away and comes back. It can feel like uncomfortable pressure, squeezing, fullness, or pain. ? Discomfort in other areas of the upper body. Symptoms can include pain or discomfort in one or both arms, the back, neck, jaw, or stomach. ? Shortness of breath with or without chest discomfort. ? Other signs may include breaking out in a cold sweat, nausea, or lightheadedness. Don't wait more than five minutes to call [address]!  Fast action can save your life. Calling 911 is almost always the fastest way to get lifesaving treatment. Emergency Medical Services staff can begin treatment when they arrive  up to an hour sooner than if someone gets to the hospital by car. The discharge information has been reviewed with the patient. The patient verbalized understanding. Discharge medications reviewed with the patient and appropriate educational materials and side effects teaching were provided. ___________________________________________________________________________________________________________________________________ Chart Review Routing History No Routing History on File

## 2018-05-12 NOTE — IP AVS SNAPSHOT
[address] [facility] [address] 
[phone] Patient: Lucas Puga MRN: [id number] EFQ:[id number] About your hospitalization You were admitted on:  May 13, 2018 You last received care in the:  [facility] You were discharged on:  May 14, 2018 Why you were hospitalized Your primary diagnosis was:  Svt (Supraventricular Tachycardia) (Hcc) Your diagnoses also included:  Chest Pain, Portal Vein Thrombosis, Anemia Follow-up Information Follow up With Details Comments Contact Info Tanesha Thurston II, DO  Pt already has an appointment scheduled with this physician. [address] 150 
[phone] Immanuel Tinsley MD   [address] [facility] [address] [facility] 150 
[phone] MD Gabbi Aguilera [address] LOWERY [phone] [address] [place] [address] 
[phone] Discharge Orders None A check brigitte indicates which time of day the medication should be taken. My Medications START taking these medications Instructions Each Dose to Equal  
 Morning Noon Evening Bedtime  
 metoprolol succinate 25 mg XL tablet Commonly known as:  TOPROL-XL Start taking on:  5/15/2018 Your last dose was: Your next dose is: Take 1 Tab by mouth daily. 25 mg CHANGE how you take these medications Instructions Each Dose to Equal  
 Morning Noon Evening Bedtime  
 ferrous sulfate 325 mg (65 mg iron) tablet What changed:  when to take this Your last dose was: Your next dose is: Take 1 Tab by mouth two (2) times daily (with meals). 325 mg  
    
   
   
   
  
 rivaroxaban 20 mg Tab tablet Commonly known as:  Asif Turcios What changed:   
- medication strength 
- how much to take - when to take this Your last dose was: Your next dose is: Take 1 Tab by mouth daily (with breakfast). Indications: blood clots 20 mg CONTINUE taking these medications Instructions Each Dose to Equal  
 Morning Noon Evening Bedtime  
 oxyCODONE-acetaminophen 5-325 mg per tablet Commonly known as:  PERCOCET Your last dose was: Your next dose is: Take 1 Tab by mouth every eight (8) hours as needed. Max Daily Amount: 3 Tabs. 1 Tab PriLOSEC 40 mg capsule Generic drug:  omeprazole Your last dose was: Your next dose is: Take 40 mg by mouth daily. 40 mg Where to Get Your Medications These medications were sent to [address], [place] - [address] [place] [address] [place] [address] & [address] [place] [address] Nelli English, [address] [facility] [address] [address] Phone:  [phone]  
  ferrous sulfate 325 mg (65 mg iron) tablet  
 metoprolol succinate 25 mg XL tablet Information on where to get these meds will be given to you by the nurse or doctor. ! Ask your nurse or doctor about these medications  
  rivaroxaban 20 mg Tab tablet Opioid Education Prescription Opioids: What You Need to Know: 
 
 
 
F-face looks uneven A-arms unable to move or move unevenly S-speech slurred or non-existent T-time-call 911 as soon as signs and symptoms begin-DO NOT go Back to bed or wait to see if you get better-TIME IS BRAIN. Warning Signs of HEART ATTACK Call 911 if you have these symptoms: ? Chest discomfort. Most heart attacks involve discomfort in the center of the chest that lasts more than a few minutes, or that goes away and comes back. It can feel like uncomfortable pressure, squeezing, fullness, or pain. ? Discomfort in other areas of the upper body. Symptoms can include pain or discomfort in one or both arms, the back, neck, jaw, or stomach. ? Shortness of breath with or without chest discomfort. ? Other signs may include breaking out in a cold sweat, nausea, or lightheadedness. Don't wait more than five minutes to call [address]! Fast action can save your life. Calling 911 is almost always the fastest way to get lifesaving treatment. Emergency Medical Services staff can begin treatment when they arrive  up to an hour sooner than if someone gets to the hospital by car. The discharge information has been reviewed with the patient. The patient verbalized understanding. Discharge medications reviewed with the patient and appropriate educational materials and side effects teaching were provided. ___________________________________________________________________________________________________________________________________ Paprika Labhart Announcement We are excited to announce that we are making your provider's discharge notes available to you in [employer]. You will see these notes when they are completed and signed by the physician that discharged you from your recent hospital stay. If you have any questions or concerns about any information you see in Paprika Labhart, please call the Health Information Department where you were seen or reach out to your Primary Care Provider for more information about your plan of care. Introducing [facility]! Dear Con Hu: Thank you for requesting a [employer] account. Our records indicate that you already have an active [employer] account. You can access your account anytime at [email or web address] Did you know that you can access your hospital and ER discharge instructions at any time in [employer]? You can also review all of your test results from your hospital stay or ER visit. Additional Information If you have questions, please visit the Frequently Asked Questions section of the [employer] website at [email or web address] Remember, [employer] is NOT to be used for urgent needs. For medical emergencies, dial 911. Now available from your iPhone and Android! Introducing Rodney Torres As a [employer] patient, I wanted to make you aware of our electronic visit tool called Rodney Torres. [employer] allows you to connect within minutes with a medical provider 24 hours a day, seven days a week via a mobile device or tablet or logging into a secure website from your computer. You can access Rodney Torres from anywhere in the [place]. A virtual visit might be right for you when you have a simple condition and feel like you just dont want to get out of bed, or cant get away from work for an appointment, when your regular [facility] provider is not available (evenings, weekends or holidays), or when youre out of town and need minor care. Electronic visits cost only $49 and if the [employer] provider determines a prescription is needed to treat your condition, one can be electronically transmitted to a nearby pharmacy*. Please take a moment to enroll today if you have not already done so. The enrollment process is free and takes just a few minutes. To enroll, please download the [employer] rosalie to your tablet or phone, or visit [email or web address] to enroll on your computer. And, as an [address] [place] [address] patient with a [employer] account, the results of your visits will be scanned into your electronic medical record and your primary care provider will be able to view the scanned results. We urge you to continue to see your [facility] provider for your ongoing medical care. And while your primary care provider may not be the one available when you seek a [employer] virtual visit, the peace of mind you get from getting a real diagnosis real time can be priceless. For more information on [employer], view our Frequently Asked Questions (FAQs) at [email or web address]. Sincerely, 
 
Claire Guzman MD 
Chief Medical Officer [address] *:  certain medications cannot be prescribed via [employer] Unresulted Labs-Please follow up with your PCP about these lab tests Order Current Status FERRITIN In process EKG, 12 LEAD, INITIAL Preliminary result Providers Seen During Your Hospitalization Provider Specialty Primary office phone Kirsten Gordon MD Emergency Medicine [phone] Светлана Jackson, [address] [place] [address] Internal Medicine [phone] Your Primary Care Physician (PCP) Primary Care Physician Office Phone Office Fax NONE ** None ** ** None ** You are allergic to the following No active allergies Recent Documentation Height Weight Breastfeeding? BMI OB Status Smoking Status 1.626 m 75.2 kg No 28.46 kg/m2 Having regular periods Former Smoker Emergency Contacts Name Discharge Info Relation Home Work Mobile [place] [facility] DISCHARGE CAREGIVER [3] Mother [14] [phone] Patient Belongings The following personal items are in your possession at time of discharge: 
  Dental Appliances: None  Visual Aid: None      Home Medications: None   Jewelry: Bracelet, Earrings  Clothing: With patient    Other Valuables: None Discharge Instructions Attachments/References ANEMIA (ENGLISH) Patient Handouts Anemia: Care Instructions Your Care Instructions Anemia is a low level of red blood cells, which carry oxygen throughout your body. Many things can cause anemia. Lack of iron is one of the most common causes. Your body needs iron to make hemoglobin, a substance in red blood cells that carries oxygen from the lungs to your body's cells. Without enough iron, the body produces fewer and smaller red blood cells. As a result, your body's cells do not get enough oxygen, and you feel tired and weak. And you may have trouble concentrating. Bleeding is the most common cause of a lack of iron. You may have heavy menstrual bleeding or bleeding caused by conditions such as ulcers, hemorrhoids, or cancer. Regular use of aspirin or other anti-inflammatory medicines (such as ibuprofen) also can cause bleeding in some people. A lack of iron in your diet also can cause anemia, especially at times when the body needs more iron, such as during pregnancy, infancy, and the teen years. Your doctor may have prescribed iron pills. It may take several months of treatment for your iron levels to return to normal. Your doctor also may suggest that you eat foods that are rich in iron, such as meat and beans. There are many other causes of anemia. It is not always due to a lack of iron. Finding the specific cause of your anemia will help your doctor find the right treatment for you. Follow-up care is a key part of your treatment and safety. Be sure to make and go to all appointments, and call your doctor if you are having problems. It's also a good idea to know your test results and keep a list of the medicines you take. How can you care for yourself at home? · Take your medicines exactly as prescribed. Call your doctor if you think you are having a problem with your medicine. · If your doctor recommends iron pills, take them as directed: ¨ Try to take the pills on an empty stomach about 1 hour before or 2 hours after meals. But you may need to take iron with food to avoid an upset stomach. ¨ Do not take antacids or drink milk or caffeine drinks (such as coffee, tea, or cola) at the same time or within 2 hours of the time that you take your iron. They can make it hard for your body to absorb the iron. ¨ Vitamin C (from food or supplements) helps your body absorb iron. Try taking iron pills with a glass of orange juice or some other food that is high in vitamin C, such as citrus fruits. ¨ Iron pills may cause stomach problems, such as heartburn, nausea, diarrhea, constipation, and cramps. Be sure to drink plenty of fluids, and include fruits, vegetables, and fiber in your diet each day. Iron pills often make your bowel movements dark or green. ¨ If you forget to take an iron pill, do not take a double dose of iron the next time you take a pill. ¨ Keep iron pills out of the reach of small children. An overdose of iron can be very dangerous. · Follow your doctor's advice about eating iron-rich foods. These include red meat, shellfish, poultry, eggs, beans, raisins, whole-grain bread, and leafy green vegetables. · Steam vegetables to help them keep their iron content. When should you call for help? Call 911 anytime you think you may need emergency care. For example, call if: 
? · You have symptoms of a heart attack. These may include: ¨ Chest pain or pressure, or a strange feeling in the chest. 
¨ Sweating. ¨ Shortness of breath. ¨ Nausea or vomiting. ¨ Pain, pressure, or a strange feeling in the back, neck, jaw, or upper belly or in one or both shoulders or arms. ¨ Lightheadedness or sudden weakness. ¨ A fast or irregular heartbeat. After you call 911, the  may tell you to chew 1 adult-strength or 2 to 4 low-dose aspirin. Wait for an ambulance. Do not try to drive yourself. ? · You passed out (lost consciousness). ?Call your doctor now or seek immediate medical care if: 
? · You have new or increased shortness of breath. ? · You are dizzy or lightheaded, or you feel like you may faint. ? · Your fatigue and weakness continue or get worse. ? · You have any abnormal bleeding, such as: 
¨ Nosebleeds. ¨ Vaginal bleeding that is different (heavier, more frequent, at a different time of the month) than what you are used to. ¨ Bloody or black stools, or rectal bleeding. ¨ Bloody or pink urine. ? Watch closely for changes in your health, and be sure to contact your doctor if: 
? · You do not get better as expected. Where can you learn more? Go to http://darian-marcus.info/. Enter R301 in the search box to learn more about \"Anemia: Care Instructions. \" Current as of: October 13, 2016 Content Version: 11.4 © 9534-1683 Healthwise, Incorporated. Care instructions adapted under license by [employer] (which disclaims liability or warranty for this information). If you have questions about a medical condition or this instruction, always ask your healthcare professional. [address] any warranty or liability for your use of this information. Please provide this summary of care documentation to your next provider. Signatures-by signing, you are acknowledging that this After Visit Summary has been reviewed with you and you have received a copy. Patient Signature:  ____________________________________________________________ Date:  ____________________________________________________________  
  
Fayrene Retort Provider Signature:  ____________________________________________________________ Date:  ____________________________________________________________

## 2018-05-12 NOTE — ED PROVIDER NOTES
EMERGENCY DEPARTMENT HISTORY AND PHYSICAL EXAM    Date: 5/12/2018  Patient Name: Phylicia Mehta    History of Presenting Illness     Chief Complaint   Patient presents with    Medication Problem         History Provided By: Patient    Chief Complaint: diaphoresis  Duration: 1 Hours  Timing:  Acute  Location: generalized  Severity: Moderate  Modifying Factors: Hx of DVT  Associated Symptoms: SOB and anxiety    Additional History (Context):   7:27 PM  Phylicia Mehta is a 29 y.o. female on xarelto for Hx of DVT who presents to the emergency department C/O diaphoresis PTA. Associated sxs include anxiety and SOB. Pt had D&C 6 weeks ago for miscarriage, pt has needed blood transfusion because of heavy vaginal bleeding. At today's blood transfusion this morning pt's HR spiked so transfusion was stopped and once vitals returned to normal pt was discharged. Pt denies CP, fever, rash, smoking, drinking, drugs, vomiting, taking anxiety meds and any other sxs or complaints. PCP: None    Current Facility-Administered Medications   Medication Dose Route Frequency Provider Last Rate Last Dose    rivaroxaban (XARELTO) tablet 10 mg  10 mg Oral NOW Gianna Foley MD        metoprolol tartrate (LOPRESSOR) tablet 25 mg  25 mg Oral NOW Gianna Foley MD         Current Outpatient Prescriptions   Medication Sig Dispense Refill    rivaroxaban (XARELTO PO) Take  by mouth daily. Indications: blood clots      ferrous sulfate 325 mg (65 mg iron) tablet Take 1 Tab by mouth daily (with breakfast). 30 Tab 0    omeprazole (PRILOSEC) 40 mg capsule Take 40 mg by mouth daily.  oxyCODONE-acetaminophen (PERCOCET) 5-325 mg per tablet Take 1 Tab by mouth every eight (8) hours as needed. Max Daily Amount: 3 Tabs.  15 Tab 0     Facility-Administered Medications Ordered in Other Encounters   Medication Dose Route Frequency Provider Last Rate Last Dose    0.9% sodium chloride infusion 250 mL  250 mL IntraVENous PRN Robb Gastelum MD 15 mL/hr at 05/12/18 1038 250 mL at 05/12/18 1038       Past History     Past Medical History:  Past Medical History:   Diagnosis Date    Anemia     Anemia     Fibroids     H/O blood clots 2017    \"vein that connects to intestine\"    Hiatal hernia     History of blood clots 10/15/2017    Intestinal    Miscarriage within last 12 months 03/2018       Past Surgical History:  Past Surgical History:   Procedure Laterality Date    HX DILATION AND CURETTAGE      HX HERNIA REPAIR         Family History:  History reviewed. No pertinent family history. Social History:  Social History   Substance Use Topics    Smoking status: Former Smoker    Smokeless tobacco: Never Used    Alcohol use 1.8 oz/week     1 Glasses of wine, 1 Cans of beer, 1 Shots of liquor per week       Allergies:  No Known Allergies      Review of Systems   Review of Systems   Constitutional: Positive for diaphoresis. Negative for fever. Respiratory: Positive for shortness of breath. Cardiovascular: Negative for chest pain. Skin: Negative for rash. Psychiatric/Behavioral: The patient is nervous/anxious. All other systems reviewed and are negative. Physical Exam     Vitals:    05/12/18 2000 05/12/18 2030 05/12/18 2045 05/12/18 2049   BP: 128/90 118/74 149/90    Pulse: (!) 103 97 (!) 131    Resp: 15 12 18    Temp:       SpO2:    99%   Weight:       Height:         Physical Exam   Nursing note and vitals reviewed.     Constitutional: Well appearing, mild distress  Head: Normocephalic, Atraumatic  Eyes: EOMI  Neck: Supple  Cardiovascular: tachy, no murmurs, rubs, or gallops  Chest: Normal work of breathing and chest excursion bilaterally  Lungs: Clear to ausculation bilaterally  Abdomen: Soft, non tender, non distended, normoactive bowel sounds  Back: No evidence of trauma or deformity  Extremities: No evidence of trauma or deformity, mild LE edema  Skin: Warm and dry, normal cap refill  Neuro: Alert and appropriate  Psychiatric: anxious mood and affect       Diagnostic Study Results     Labs -     Recent Results (from the past 12 hour(s))   CBC WITH AUTOMATED DIFF    Collection Time: 05/12/18 12:30 PM   Result Value Ref Range    WBC 5.9 4.0 - 11.0 1000/mm3    RBC 2.41 (L) 3.60 - 5.20 M/uL    HGB 7.7 (L) 13.0 - 17.2 gm/dl    HCT 24.2 (L) 37.0 - 50.0 %    .4 (H) 80.0 - 98.0 fL    MCH 32.0 25.4 - 34.6 pg    MCHC 31.8 30.0 - 36.0 gm/dl    PLATELET 780 800 - 677 1000/mm3    MPV 9.8 6.0 - 10.0 fL    RDW-SD 61.7 (H) 36.4 - 46.3      NRBC 4 (H) 0 - 0      IMMATURE GRANULOCYTES 3.4 (H) 0.0 - 3.0 %    NEUTROPHILS 63.2 34 - 64 %    LYMPHOCYTES 25.3 (L) 28 - 48 %    MONOCYTES 7.4 1 - 13 %    EOSINOPHILS 0.5 0 - 5 %    BASOPHILS 0.2 0 - 3 %   IRON PROFILE    Collection Time: 05/12/18 12:30 PM   Result Value Ref Range    Iron 101 50 - 170 mcg/dl    TIBC 247 (L) 250 - 450 mcg/dl    Iron % saturation 41 20 - 45 %   POC CHEM8    Collection Time: 05/12/18 12:44 PM   Result Value Ref Range    Sodium 142 136 - 145 mEq/L    Potassium 3.4 (L) 3.5 - 4.9 mEq/L    Chloride 107 98 - 107 mEq/L    CO2, TOTAL 23 21 - 32 mmol/L    Glucose 89 74 - 106 mg/dL    BUN 11 7 - 25 mg/dl    Creatinine 0.7 0.6 - 1.3 mg/dl    HCT 26 (L) 38 - 45 %    HGB 8.8 (L) 13.0 - 17.2 gm/dl    CALCIUM,IONIZED 4.60 4.40 - 5.40 mg/dL   EKG, 12 LEAD, INITIAL    Collection Time: 05/12/18  8:15 PM   Result Value Ref Range    Ventricular Rate 103 BPM    Atrial Rate 103 BPM    P-R Interval 144 ms    QRS Duration 82 ms    Q-T Interval 350 ms    QTC Calculation (Bezet) 458 ms    Calculated P Axis 49 degrees    Calculated R Axis 52 degrees    Calculated T Axis 25 degrees    Diagnosis       Sinus tachycardia  Nonspecific T wave abnormality  Abnormal ECG  When compared with ECG of 12-APR-2018 20:42,  CT interval has increased     HCG URINE, QL. - POC    Collection Time: 05/12/18  8:22 PM   Result Value Ref Range    Pregnancy test,urine (POC) NEGATIVE  NEG     CBC WITH AUTOMATED DIFF    Collection Time: 05/12/18  8:25 PM   Result Value Ref Range    WBC 6.6 4.6 - 13.2 K/uL    RBC 2.35 (L) 4.20 - 5.30 M/uL    HGB 7.3 (L) 12.0 - 16.0 g/dL    HCT 23.0 (L) 35.0 - 45.0 %    MCV 97.9 (H) 74.0 - 97.0 FL    MCH 31.1 24.0 - 34.0 PG    MCHC 31.7 31.0 - 37.0 g/dL    RDW 18.5 (H) 11.6 - 14.5 %    PLATELET 306 952 - 236 K/uL    MPV 8.7 (L) 9.2 - 11.8 FL    NEUTROPHILS 68 40 - 73 %    LYMPHOCYTES 25 21 - 52 %    MONOCYTES 5 3 - 10 %    EOSINOPHILS 1 0 - 5 %    BASOPHILS 1 0 - 2 %    ABS. NEUTROPHILS 4.4 1.8 - 8.0 K/UL    ABS. LYMPHOCYTES 1.7 0.9 - 3.6 K/UL    ABS. MONOCYTES 0.3 0.05 - 1.2 K/UL    ABS. EOSINOPHILS 0.1 0.0 - 0.4 K/UL    ABS. BASOPHILS 0.1 (H) 0.0 - 0.06 K/UL    RBC COMMENTS ANISOCYTOSIS  2+        RBC COMMENTS POLYCHROMASIA  1+        RBC COMMENTS MACROCYTOSIS  1+        RBC COMMENTS TEARDROP CELLS  FEW        DF AUTOMATED     METABOLIC PANEL, COMPREHENSIVE    Collection Time: 05/12/18  8:25 PM   Result Value Ref Range    Sodium 141 136 - 145 mmol/L    Potassium 3.2 (L) 3.5 - 5.5 mmol/L    Chloride 108 100 - 108 mmol/L    CO2 24 21 - 32 mmol/L    Anion gap 9 3.0 - 18 mmol/L    Glucose 104 (H) 74 - 99 mg/dL    BUN 9 7.0 - 18 MG/DL    Creatinine 0.81 0.6 - 1.3 MG/DL    BUN/Creatinine ratio 11 (L) 12 - 20      GFR est AA >60 >60 ml/min/1.73m2    GFR est non-AA >60 >60 ml/min/1.73m2    Calcium 8.6 8.5 - 10.1 MG/DL    Bilirubin, total 0.4 0.2 - 1.0 MG/DL    ALT (SGPT) 19 13 - 56 U/L    AST (SGOT) 20 15 - 37 U/L    Alk.  phosphatase 63 45 - 117 U/L    Protein, total 6.8 6.4 - 8.2 g/dL    Albumin 3.1 (L) 3.4 - 5.0 g/dL    Globulin 3.7 2.0 - 4.0 g/dL    A-G Ratio 0.8 0.8 - 1.7     CARDIAC PANEL,(CK, CKMB & TROPONIN)    Collection Time: 05/12/18  8:25 PM   Result Value Ref Range    CK 44 26 - 192 U/L    CK - MB <1.0 <3.6 ng/ml    CK-MB Index  0.0 - 4.0 %     CALCULATION NOT PERFORMED WHEN RESULT IS BELOW LINEAR LIMIT    Troponin-I, Qt. <0.02 0.00 - 0.06 NG/ML   MAGNESIUM    Collection Time: 05/12/18  8:25 PM   Result Value Ref Range    Magnesium 1.6 1.6 - 2.6 mg/dL   D DIMER    Collection Time: 05/12/18  8:25 PM   Result Value Ref Range    D DIMER 0.85 (H) <0.46 ug/ml(FEU)       Radiologic Studies -     11:12 PM  RADIOLOGY FINDINGS  Chest X-ray shows NAP  Pending review by Radiologist  Recorded by Rita Morales , ED Scribe, as dictated by Diogo Kyle MD     DUPLEX LOWER EXT VENOUS BILAT   INTERPRETATION/FINDINGS  Duplex images were obtained using 2-D gray scale, color flow, and  spectral Doppler analysis. Right leg :  1. Deep vein(s) visualized include the common femoral, deep femoral,  proximal femoral, mid femoral, distal femoral, popliteal(above knee),  popliteal(fossa), popliteal(below knee), posterior tibial and peroneal   veins. 2. No evidence of deep venous thrombosis detected in the veins  visualized. 3. Superficial vein(s) visualized include the great saphenous vein. 4. No evidence of superficial thrombosis detected. Left leg :  1. Deep vein(s) visualized include the common femoral, deep femoral,  proximal femoral, mid femoral, distal femoral, popliteal(above knee),  popliteal(fossa), popliteal(below knee), posterior tibial and peroneal   veins. 2. No evidence of deep venous thrombosis detected in the veins  visualized. 3. Superficial vein(s) visualized include the great saphenous vein.   4. No evidence of superficial thrombosis detected.     ADDITIONAL COMMENTS     I have personally reviewed the data relevant to the interpretation of  this study.     TECHNOLOGIST: Suzan Alejandre RDCS, RVT  Signed: 05/12/2018 10:41 PM      XR CHEST SNGL V    (Results Pending)     CT Results  (Last 48 hours)    None        CXR Results  (Last 48 hours)    None          Medications given in the ED-  Medications   rivaroxaban (XARELTO) tablet 10 mg (not administered)   metoprolol tartrate (LOPRESSOR) tablet 25 mg (not administered)   sodium chloride 0.9 % bolus infusion 1,000 mL (1,000 mL IntraVENous New Bag 5/12/18 2027) potassium chloride (K-DUR, KLOR-CON) SR tablet 40 mEq (40 mEq Oral Given 5/12/18 2241)         Medical Decision Making   I am the first provider for this patient. I reviewed the vital signs, available nursing notes, past medical history, past surgical history, family history and social history. Vital Signs-Reviewed the patient's vital signs. Pulse Oximetry Analysis - 100% on RA     Cardiac Monitor:  Rate: 103 bpm  Rhythm: sinus tach    EKG interpretation: (Preliminary)  7:26 PM   Rate 103 bpm. Sinus tach. QRS 82 ms. Similar to 4/12/18  EKG read by Richardson Rincon MD at 20:15     EKG interpretation: (Secondary)  8:44 PM   Rate 125 bpm. Sinus tach. QRS duration 78 ms  EKG read by Richardson Rincon MD at 20:31     Records Reviewed: Nursing Notes    Procedures:  Procedures    ED Course:   7:27 PM Initial assessment performed. The patients presenting problems have been discussed, and they are in agreement with the care plan formulated and outlined with them. I have encouraged them to ask questions as they arise throughout their visit. 8:35 PM Family member called me to room because pt said she felt bad. Monitor showed SVT. Getting EKG now and will print rhythm strip.    8:44 PM Rhythm is now sinus tach. Pt states she has slight CP and SOB.    9:27 PM Discussed patient's history, exam, and available diagnostics results with Miguel Alejandre MD, cardiology, who agree with consulting. He agrees with getting d-dimer and doppler because of pt's hx of being hypercoagulable. 9:45 PM Updated pt on plan. 10:43 PM Discussed patient's history, exam, and available diagnostics results with Tisha Rashid DO, hospitalist, who agree with not repeating CTA. Recommends staring xarelto giving oral metoprolol. He will admit to tele . Diagnosis and Disposition     Core Measures:  For Hospitalized Patients:    1.  Hospitalization Decision Time:  The decision to hospitalize the patient was made by Richardson Rincon MD  at 9:00 PM on 5/12/2018    2. Aspirin: Aspirin was not given because the patient did not present with a stroke at the time of their Emergency Department evaluation    10:47 PM  Patient is being admitted to the hospital by Marin Woodruff DO. The results of their tests and reasons for their admission have been discussed with them and/or available family. They convey agreement and understanding for the need to be admitted and for their admission diagnosis. CONDITIONS ON ADMISSION:  Sepsis is not present at the time of admission. Deep Vein Thrombosis is not present at the time of admission. Thrombosis is not present at the time of admission. Urinary Tract Infection is not present at the time of admission. Pneumonia is not present at the time of admission. MRSA is not present at the time of admission. Wound infection is not present at the time of admission. Pressure Ulcer is not present at the time of admission. CLINICAL IMPRESSION:    1. SOB (shortness of breath)    2. Other chest pain    3. Other cardiac arrhythmia    4. Hypokalemia    5. Chronic anemia      DISCUSSION:  29 y.o female present for SOB after transfusion today. She has complex recent medical history including thrombosis, was on xarelto. Hx of anemia and recent miscarriage requiring D&C. She had episode of SVT in ED. She remained symptomatic with CP and SOB. Though d-dimer slightly elevated will not pursue CTA at this time due to recent negative CTA last month. Will restart Xarelto. Discussed with hospitalist and cardiologist for further inpatient managemtn  _______________________________    Attestations: This note is prepared by More Gaspar, acting as Scribe for Edwige Appiah MD .    Edwige Appiah MD:  The scribe's documentation has been prepared under my direction and personally reviewed by me in its entirety.   I confirm that the note above accurately reflects all work, treatment, procedures, and medical decision making performed by me.  _______________________________

## 2018-05-12 NOTE — Clinical Note
Patient Class[de-identified] Observation [218] Type of Bed: Telemetry [19] Reason for Observation: tachycardia, arrythmia Admitting Diagnosis: Chest pain [348021] Admitting Physician: Gaurav Cid Attending Physician: Gaurav Cid

## 2018-05-13 LAB
ANION GAP SERPL CALC-SCNC: 7 MMOL/L (ref 3–18)
BASOPHILS # BLD: 0.1 K/UL (ref 0–0.06)
BASOPHILS NFR BLD: 1 % (ref 0–2)
BUN SERPL-MCNC: 7 MG/DL (ref 7–18)
BUN/CREAT SERPL: 10 (ref 12–20)
CALCIUM SERPL-MCNC: 8.6 MG/DL (ref 8.5–10.1)
CHLORIDE SERPL-SCNC: 109 MMOL/L (ref 100–108)
CO2 SERPL-SCNC: 26 MMOL/L (ref 21–32)
CREAT SERPL-MCNC: 0.69 MG/DL (ref 0.6–1.3)
DIFFERENTIAL METHOD BLD: ABNORMAL
EOSINOPHIL # BLD: 0.1 K/UL (ref 0–0.4)
EOSINOPHIL NFR BLD: 1 % (ref 0–5)
ERYTHROCYTE [DISTWIDTH] IN BLOOD BY AUTOMATED COUNT: 19.3 % (ref 11.6–14.5)
ERYTHROCYTE [DISTWIDTH] IN BLOOD BY AUTOMATED COUNT: 19.4 % (ref 11.6–14.5)
GLUCOSE SERPL-MCNC: 89 MG/DL (ref 74–99)
HCT VFR BLD AUTO: 22.7 % (ref 35–45)
HCT VFR BLD AUTO: 23.2 % (ref 35–45)
HGB BLD-MCNC: 7.2 G/DL (ref 12–16)
HGB BLD-MCNC: 7.3 G/DL (ref 12–16)
LYMPHOCYTES # BLD: 1.5 K/UL (ref 0.9–3.6)
LYMPHOCYTES NFR BLD: 25 % (ref 21–52)
MCH RBC QN AUTO: 31.1 PG (ref 24–34)
MCH RBC QN AUTO: 31.7 PG (ref 24–34)
MCHC RBC AUTO-ENTMCNC: 31.5 G/DL (ref 31–37)
MCHC RBC AUTO-ENTMCNC: 31.7 G/DL (ref 31–37)
MCV RBC AUTO: 100 FL (ref 74–97)
MCV RBC AUTO: 98.7 FL (ref 74–97)
MONOCYTES # BLD: 0.4 K/UL (ref 0.05–1.2)
MONOCYTES NFR BLD: 6 % (ref 3–10)
NEUTS SEG # BLD: 3.9 K/UL (ref 1.8–8)
NEUTS SEG NFR BLD: 67 % (ref 40–73)
PLATELET # BLD AUTO: 233 K/UL (ref 135–420)
PLATELET # BLD AUTO: 235 K/UL (ref 135–420)
PLATELET COMMENTS,PCOM: ABNORMAL
PMV BLD AUTO: 8.5 FL (ref 9.2–11.8)
PMV BLD AUTO: 8.6 FL (ref 9.2–11.8)
POTASSIUM SERPL-SCNC: 3.7 MMOL/L (ref 3.5–5.5)
RBC # BLD AUTO: 2.27 M/UL (ref 4.2–5.3)
RBC # BLD AUTO: 2.35 M/UL (ref 4.2–5.3)
RBC MORPH BLD: ABNORMAL
SODIUM SERPL-SCNC: 142 MMOL/L (ref 136–145)
T4 FREE SERPL-MCNC: 1 NG/DL (ref 0.7–1.5)
TSH SERPL DL<=0.05 MIU/L-ACNC: 2.36 UIU/ML (ref 0.36–3.74)
WBC # BLD AUTO: 6 K/UL (ref 4.6–13.2)
WBC # BLD AUTO: 6.5 K/UL (ref 4.6–13.2)

## 2018-05-13 PROCEDURE — 86920 COMPATIBILITY TEST SPIN: CPT

## 2018-05-13 PROCEDURE — 74011250637 HC RX REV CODE- 250/637: Performed by: INTERNAL MEDICINE

## 2018-05-13 PROCEDURE — 86901 BLOOD TYPING SEROLOGIC RH(D): CPT

## 2018-05-13 PROCEDURE — 99218 HC RM OBSERVATION: CPT

## 2018-05-13 PROCEDURE — 80048 BASIC METABOLIC PNL TOTAL CA: CPT | Performed by: INTERNAL MEDICINE

## 2018-05-13 PROCEDURE — 36415 COLL VENOUS BLD VENIPUNCTURE: CPT | Performed by: INTERNAL MEDICINE

## 2018-05-13 PROCEDURE — 65270000029 HC RM PRIVATE

## 2018-05-13 PROCEDURE — 85025 COMPLETE CBC W/AUTO DIFF WBC: CPT | Performed by: INTERNAL MEDICINE

## 2018-05-13 PROCEDURE — 93306 TTE W/DOPPLER COMPLETE: CPT

## 2018-05-13 PROCEDURE — 85027 COMPLETE CBC AUTOMATED: CPT | Performed by: INTERNAL MEDICINE

## 2018-05-13 RX ORDER — SODIUM CHLORIDE 9 MG/ML
250 INJECTION, SOLUTION INTRAVENOUS AS NEEDED
Status: DISCONTINUED | OUTPATIENT
Start: 2018-05-13 | End: 2018-05-14 | Stop reason: HOSPADM

## 2018-05-13 RX ORDER — LANOLIN ALCOHOL/MO/W.PET/CERES
1 CREAM (GRAM) TOPICAL
Status: DISCONTINUED | OUTPATIENT
Start: 2018-05-13 | End: 2018-05-14

## 2018-05-13 RX ORDER — METOPROLOL SUCCINATE 25 MG/1
25 TABLET, EXTENDED RELEASE ORAL DAILY
Status: DISCONTINUED | OUTPATIENT
Start: 2018-05-13 | End: 2018-05-14 | Stop reason: HOSPADM

## 2018-05-13 RX ORDER — DIPHENHYDRAMINE HCL 25 MG
25 CAPSULE ORAL
Status: DISCONTINUED | OUTPATIENT
Start: 2018-05-13 | End: 2018-05-14 | Stop reason: HOSPADM

## 2018-05-13 RX ORDER — LORAZEPAM 2 MG/ML
1 INJECTION INTRAMUSCULAR
Status: DISCONTINUED | OUTPATIENT
Start: 2018-05-13 | End: 2018-05-14 | Stop reason: HOSPADM

## 2018-05-13 RX ADMIN — FERROUS SULFATE TAB 325 MG (65 MG ELEMENTAL FE) 325 MG: 325 (65 FE) TAB at 09:39

## 2018-05-13 RX ADMIN — METOPROLOL SUCCINATE 25 MG: 25 TABLET, EXTENDED RELEASE ORAL at 16:26

## 2018-05-13 NOTE — ROUTINE PROCESS
Bedside and Verbal shift change report given to Salud Kern RN (oncoming nurse) by Snow Wayne RN (offgoing nurse). Report included the following information SBAR, Kardex and ED Summary.

## 2018-05-13 NOTE — PROGRESS NOTES
Problem: Falls - Risk of  Goal: *Absence of Falls  Document Joceline Fall Risk and appropriate interventions in the flowsheet.    Outcome: Progressing Towards Goal  Fall Risk Interventions:            Medication Interventions: Bed/chair exit alarm, Patient to call before getting OOB, Teach patient to arise slowly

## 2018-05-13 NOTE — ROUTINE PROCESS
Bedside and Verbal shift change report given to Yesica Mohr RN (oncoming nurse) by Nellie Haywood RN (offgoing nurse). Report included the following information SBAR, Kardex, ED Summary, Procedure Summary, Intake/Output, Accordion, Recent Results and Med Rec Status.

## 2018-05-13 NOTE — CONSULTS
[id number] [facility]  MR#: [id number]  : 1985  ACCOUNT #: [de-identified]   DATE OF SERVICE: 2018    REASON FOR CONSULTATION:  Possible SVT, sinus tachycardia during a blood transfusion for symptomatic anemia. HISTORY OF PRESENT ILLNESS:  The patient is a 82-year-old very pleasant female with a complex medical history of a recent , history of portal vein thrombosis, on Xarelto. Also, has a history of uterine fibroids and excessive hemorrhage, menorrhagia and have significant anemia. She have received total of 5 blood transfusion according to the patient. Yesterday she was getting two blood transfusion during the second blood transfusion she became more tachycardic and then came to the hospital, diagnosed with possible sinus tachycardia, no clear cut evidence of SVT, but according to the ER physician, it is possible SVT, but no definitive atrial flutter or AFib. Patient also had mild chest discomfort with palpitation and significant anxiety also. Her chest pain was clearly noncardiac in etiology, nonspecific in the setting of stress ulcer. No history of premature coronary artery disease or cardiac condition in the family. PAST MEDICAL HISTORY:  1. Anemia. 2.  Miscarriage. 3.  Portal vein thrombosis. 4.  Uterine fibroid. 5.  Significant anemia requiring blood transfusion. PAST SURGICAL HISTORY:  Significant for dilatation and curettage and hernia repair. SOCIAL HISTORY:  Quit smoking, denied any alcohol abuse. Drinks alcohol on weekends. Denied any drug abuse also. MEDICATIONS:  The home medications include Tylenol, Benadryl. Other medications she is on Xarelto, ferrous sulfate, Prilosec and Percocet. ALLERGIES:  NO KNOWN DRUG ALLERGIES. REVIEW OF SYSTEMS:  Ten point review of systems completed, positive pertinent findings discussed in the history of present illness. Rest of the systems are negative.     PHYSICAL EXAMINATION:  GENERAL:  At the time of consultation 29year-old comfortable, not in any distress, not using any accessory muscles of respiration. VITAL SIGNS:  Temperature is 98.3, heart rate is 92 per minute, respiration is 18, blood pressure is 123/82 mmHg, O2 saturation is 100%. HEENT:  Atraumatic, normocephalic. NECK:  Supple, no JVD, no carotid bruit. HEART:  S1, S2 audible. LUNGS:  Bilateral air entry positive. No added sounds. ABDOMEN:  Soft, nontender. Bowel sounds audible. EXTREMITIES:  No edema. Pulses are palpable. DERMATOLOGICAL:  No skin rash. NEUROLOGIC:  Alert and oriented to time, place and person. MUSCULOSKELETAL:  No obvious joint deformity. LABORATORY DATA:  Hemoglobin is 7.3, platelet count is 216, MCV is 98.7. Sodium 142, potassium 3.7, creatinine is 0.69, troponin 0.02. Echocardiogram is normal.  The EKG has shown normal sinus rhythm. I do not have a strip of SVT at this point. ASSESSMENT AND PLAN:  1. Possible SVT. I do not have a strip of SVT available for analysis. 2.  Possible sinus tachycardia in the setting of anxiety and severe anemia and possible blood transfusion reaction. 3.  Portal vein thrombosis. 4.  Symptomatic anemia. RECOMMENDATIONS:  Start the patient on Toprol-XL 25 mg daily. Echocardiogram was essentially normal.  I think at this point, the primary issue is anemia and treatment of portal vein thrombosis. I will defer the management of anemia and portal vein thrombosis with anticoagulation to the hospitalist team.  At this point, the patient's symptoms are noncardiac. If the patient have palpitation after correction of anemia, then I will consider a possible heart monitor in the outpatient setting. Discussed with the patient and family. I will sign off. Follow up with me in 1 month in outpatient cardiology.       Celeste Mckeon MD MA / JUAN JOSE  D: 05/13/2018 16:03     T: 05/13/2018 16:38  JOB #: [id number]

## 2018-05-13 NOTE — PROGRESS NOTES
Hospitalist Progress Note    Patient: Hermann Chase MRN: [id number]  CSN: [id number]    YOB: 1985  Age: 29 y.o. Sex: female    DOA: 5/12/2018 LOS:  LOS: 0 days            Assessment/Plan     Principal Problem:    SVT (supraventricular tachycardia) ([address] (5/12/2018)    Active Problems:    Portal vein thrombosis (10/14/2017)      Anemia (4/12/2018)      Chest pain (5/12/2018)      SVT in setting of symptomatic anemia: Echo in am. Discussed the case with Dr. Aline Rondon who will see her today. SVT may 2nd to reaction to PRBC transfusion    Portal vein thrombosis on xarelto: Discussed the case with On call hematology/Dr. Regino Mcburney who will see pt today    Acute on chronic Anemia: Hb 7.3 today. Pt refused the PRBC transfusion    Hypokalemia: K repletion, will monitor    Full code    Long discussion with pt's mom in the presence of patient re medicines, hospital course. We reviewed and discussed assessment of condition and went over plans of care. Questions answered. Total time 35 min's, including more than 50% on discussion with family and coordinating care. CC: Anemia, SVT, PVT, hypokalemia      Subjective:     Pt was seen and examined with the nurse in the morning round. Reports that she is on menses with no blood clots. C/O bilateral groin pain after the BM this am. No edema   Her mom was at the bedside. Review of systems  General: No fevers or chills. Cardiovascular: No chest pain or pressure. No palpitations. Pulmonary: No cough, SOB  Gastrointestinal: No nausea, vomiting. Objective:      Visit Vitals    /77 (BP 1 Location: Left arm, BP Patient Position: At rest)    Pulse 82    Temp 98.3 °F (36.8 °C)    Resp 18    Ht 5' 4\" (1.626 m)    Wt 74 kg (163 lb 2.3 oz)    SpO2 100%    Breastfeeding No    BMI 28 kg/m2       Physical Exam:    Gen: NAD, non-toxic. Heent:  MMM, NC, AT. Cor: s1s2 RRR. No MRG. PMI mid 5th intercostal space. Resp:  CTA b/l. No w/r/r.   Nml effort and diaphragmatic excursion. Abd:  NT ND.  BS positive. No rebound or guarding. No masses. Ext: No edema or cyanosis. Intake and Output:  Current Shift:  05/13 0701 - 05/13 1900  In: 0   Out: 900 [Urine:900]  Last three shifts:  05/11 1901 - 05/13 0700  In: 500 [P.O.:480; I.V.:20]  Out: 0     Labs: Results:       Chemistry Recent Labs      05/13/18 0900 05/12/18 2025 05/12/18   1244   GLU  89  104*  89   NA  142  141  142   K  3.7  3.2*  3.4*   CL  109*  108  107   CO2  26  24  23   BUN  7  9  11   CREA  0.69  0.81  0.7   CA  8.6  8.6   --    AGAP  7  9   --    BUCR  10*  11*   --    AP   --   63   --    TP   --   6.8   --    ALB   --   3.1*   --    GLOB   --   3.7   --    AGRAT   --   0.8   --       CBC w/Diff Recent Labs      05/13/18   0900 05/12/18 2025 05/12/18   1244  05/12/18   1230   WBC  6.0  6.6   --   5.9   RBC  2.35*  2.35*   --   2.41*   HGB  7.3*  7.3*  8.8*  7.7*   HCT  23.2*  23.0*  26*  24.2*   PLT  235  235   --   263   GRANS  67  68   --   63.2   LYMPH  25  25   --   25.3*   EOS  1  1   --   0.5      Cardiac Enzymes Recent Labs      05/12/18 2025   CPK  44   CKND1  CALCULATION NOT PERFORMED WHEN RESULT IS BELOW LINEAR LIMIT      Coagulation No results for input(s): PTP, INR, APTT in the last 72 hours. No lab exists for component: INREXT, INREXT    Lipid Panel No results found for: CHOL, CHOLPOCT, CHOLX, CHLST, CHOLV, 278677, HDL, LDL, LDLC, DLDLP, 213896, VLDLC, VLDL, TGLX, TRIGL, TRIGP, TGLPOCT, CHHD, CHHDX   BNP No results for input(s): BNPP in the last 72 hours.    Liver Enzymes Recent Labs      05/12/18 2025   TP  6.8   ALB  3.1*   AP  63   SGOT  20      Thyroid Studies Lab Results   Component Value Date/Time    TSH 2.36 05/12/2018 08:25 PM        Procedures/imaging: see electronic medical records for all procedures/Xrays and details which were not copied into this note but were reviewed prior to creation of Plan      Medications Reviewed  Sarah Almaraz MD

## 2018-05-13 NOTE — ROUTINE PROCESS
Bedside report given to Francis Calvillo RN on transfer to Telemetry. RN and patient aware of current plan of care. Pt is resting supine in bed. Continuous monitoring in place. Bed in low position with side rails up and call bell in reach.

## 2018-05-13 NOTE — PROGRESS NOTES
Problem: Falls - Risk of  Goal: *Absence of Falls  Document Joceline Fall Risk and appropriate interventions in the flowsheet.    Outcome: Progressing Towards Goal  Fall Risk Interventions:            Medication Interventions: Teach patient to arise slowly

## 2018-05-13 NOTE — PROGRESS NOTES
Chart reviewed. Pt admitted for SVT. Reason for Admission:   Per chart, pt is a 29 y.o. female with past medical history significant for portal vein thrombosis w recent hospitalization following miscarriage and severe anemia presents to the ER with diaphoresis and anxiety surrouinding a blood transfusion which was ordered for anemia due to menorrhagia. She reports her heart rate was elevated during the transfusion accompanied by anxiety, chest pressure and palptations.     On presentation to the ER she was afebrile, tachycardic w episodes of HR in the 160-170s, normotensive. Exam was unremarkable. Labs w hg of 7.3, K3.2. EKG with out acute ST changes, CXR clear. Medicine is asked to admit for further management. RRAT Score:   7, low                  Plan for utilizing home health:      None at this time                    Likelihood of Readmission:  Low                         Transition of Care Plan: Will need cardiology and PCP follow up.       Care Management Interventions  Transition of Care Consult (CM Consult): Discharge Planning

## 2018-05-13 NOTE — ED NOTES
Patient assisted to bedside commode without incident. Patient resting supine in bed with continuous monitoring in place. Side rails up with call bell in reach. Patient advised of plan of care. Awaiting scan and possible admission.

## 2018-05-13 NOTE — ED NOTES
Tech in room performing dopplar. Patient resting supine in bed with continuous monitoring in place. Side rails up with call bell in reach. Patient advised of plan of care. Family at bedside.

## 2018-05-13 NOTE — PROCEDURES
[facility]  *** FINAL REPORT ***    Name: Trace Gibson  MRN: [id number]    Outpatient  : 1985  HIS Order #: [id number]  [address] [facility] Visit #: [id number]  Date: 12 May 2018    TYPE OF TEST: Peripheral Venous Testing    REASON FOR TEST  Pain in limb    Right Leg:-  Deep venous thrombosis:           No  Superficial venous thrombosis:    No  Deep venous insufficiency:        Not examined  Superficial venous insufficiency: Not examined    Left Leg:-  Deep venous thrombosis:           No  Superficial venous thrombosis:    No  Deep venous insufficiency:        Not examined  Superficial venous insufficiency: Not examined      INTERPRETATION/FINDINGS  Duplex images were obtained using 2-D gray scale, color flow, and  spectral Doppler analysis. Right leg :  1. Deep vein(s) visualized include the common femoral, deep femoral,  proximal femoral, mid femoral, distal femoral, popliteal(above knee),  popliteal(fossa), popliteal(below knee), posterior tibial and peroneal   veins. 2. No evidence of deep venous thrombosis detected in the veins  visualized. 3. Superficial vein(s) visualized include the great saphenous vein. 4. No evidence of superficial thrombosis detected. Left leg :  1. Deep vein(s) visualized include the common femoral, deep femoral,  proximal femoral, mid femoral, distal femoral, popliteal(above knee),  popliteal(fossa), popliteal(below knee), posterior tibial and peroneal   veins. 2. No evidence of deep venous thrombosis detected in the veins  visualized. 3. Superficial vein(s) visualized include the great saphenous vein. 4. No evidence of superficial thrombosis detected. ADDITIONAL COMMENTS    I have personally reviewed the data relevant to the interpretation of  this  study. TECHNOLOGIST: Dany Proctor RDCS, RVT  Signed: 2018 10:41 PM    PHYSICIAN: Austin Sparrow.  Marichuy Odonnell MD  Signed: 2018 10:06 AM

## 2018-05-13 NOTE — H&P
History & Physical    Patient: Lucas Puga MRN: [id number]  CSN: [id number]    YOB: 1985  Age: 29 y.o. Sex: female      DOA: 5/12/2018  Primary Care Provider:  None      Assessment/Plan     1. SVT in setting of symptomatic anemia   2. Portal vein thrombosis on xarelto  3. Anemia  4. hypokalemia    PLAN:  -Admit to medical service with telemetry monitoring  -start metoprolol po  - replete K, check magnesium  - 2d echo  - TSH/ft4  - cardiology consult  - repeat h/h in AM, consider transfusion  - continue xarelto  - full code    Patient Active Problem List   Diagnosis Code    Portal vein thrombosis I81    Abdominal pain R10.9    Pleural effusion J90    Mesenteric vein thrombosis I81    Vaginal bleeding N93.9    Anemia D64.9    Miscarriage O03.9    Atelectasis J98.11    Coagulation defect ([address] D68.9    Tachycardia R00.0    Pulmonary nodule R91.1    Chest pain R07.9    SVT (supraventricular tachycardia) ([facility]) I47.1     HPI:   CC: dyspnea  Lucas Puga is a 29 y.o. female with past medical history significant for portal vein thrombosis w recent hospitalization following miscarriage and severe anemia presents to the ER with diaphoresis and anxiety surrouinding a blood transfusion which was ordered for anemia due to menorrhagia. She reports her heart rate was elevated during the transfusion accompanied by anxiety, chest pressure and palptations. On presentation to the ER she was afebrile, tachycardic w episodes of HR in the 160-170s, normotensive. Exam was unremarkable. Labs w hg of 7.3, K3.2. EKG with out acute ST changes, CXR clear. Medicine is asked to admit for further management.        Past Medical History:   Diagnosis Date    Anemia     Anemia     Fibroids     H/O blood clots 2017    \"vein that connects to intestine\"    Hiatal hernia     History of blood clots 10/15/2017    Intestinal    Miscarriage within last 12 months 03/2018     Past Surgical History: Procedure Laterality Date    HX DILATION AND CURETTAGE      HX HERNIA REPAIR        Social History   Substance Use Topics    Smoking status: Former Smoker    Smokeless tobacco: Never Used    Alcohol use 1.8 oz/week     1 Glasses of wine, 1 Cans of beer, 1 Shots of liquor per week     History reviewed. No pertinent family history. Current Facility-Administered Medications on File Prior to Encounter   Medication Dose Route Frequency Provider Last Rate Last Dose    [COMPLETED] acetaminophen (TYLENOL) tablet 650 mg  650 mg Oral ONCE Helen Samayoa MD   650 mg at 05/12/18 1006    [COMPLETED] diphenhydrAMINE (BENADRYL) injection 25 mg  25 mg IntraVENous ONCE Helen Samayoa MD   25 mg at 05/12/18 1006    0.9% sodium chloride infusion 250 mL  250 mL IntraVENous PRN Helen Samayoa MD 15 mL/hr at 05/12/18 1038 250 mL at 05/12/18 1038    [COMPLETED] acetaminophen (TYLENOL) tablet 975 mg  975 mg Oral NOW Estella Mercado MD   975 mg at 05/12/18 1543     Current Outpatient Prescriptions on File Prior to Encounter   Medication Sig Dispense Refill    rivaroxaban (XARELTO PO) Take  by mouth daily. Indications: blood clots      ferrous sulfate 325 mg (65 mg iron) tablet Take 1 Tab by mouth daily (with breakfast). 30 Tab 0    omeprazole (PRILOSEC) 40 mg capsule Take 40 mg by mouth daily.  oxyCODONE-acetaminophen (PERCOCET) 5-325 mg per tablet Take 1 Tab by mouth every eight (8) hours as needed. Max Daily Amount: 3 Tabs.  15 Tab 0      No Known Allergies      Review of Systems  Constitutional: No fever, chills, diaphoresis, malaise, fatigue or weight gain/loss or falls  Skin: no itching or rashes  HEENT: no neck stiffness, hearing loss, tinnitus, epistaxis or sore throat  EYES: no blurry vision, double vision or photophobia  CARDIOVASCULAR: + cp,+ palpitations, -orthopnea, -pnd or LE edema  PULMONARY: no cough, wheeze, shortness of breath or sputum production  GI: no nausea, vomiting, diarrhea, abdominal pain, melena, hematemesis or brbpr  : no dysuria, hematuria  MUSCULOSKELETAL: no back pain, joint pain or myalgias  ENDOCRINE: no heat/cold intolerance, polyuria or polydipsia  HEME: no easy bruising or bleeding  NEURO: no unilateral weakness, numbness, tingling or seizures      Physical Exam:        Visit Vitals    /90    Pulse (!) 131    Temp 98.1 °F (36.7 °C)    Resp 18    Ht 5' 4\" (1.626 m)    Wt 73.9 kg (163 lb)    LMP 2018    SpO2 99%    BMI 27.98 kg/m2      O2 Device: Room air    Temp (24hrs), Av.1 °F (36.7 °C), Min:97.7 °F (36.5 °C), Max:98.3 °F (36.8 °C)           Body mass index is 27.98 kg/(m^2). General:  Awake, cooperative, no distress. Head:  Normocephalic, without obvious abnormality, atraumatic. Eyes:  Conjunctivae/corneas clear, sclera anicteric, PERRL, EOMs intact. Nose: Nares normal. No drainage or sinus tenderness. Throat: Lips, mucosa, and tongue normal. .   Neck: Supple, symmetrical, trachea midline, no adenopathy. Lungs:   Clear to auscultation bilaterally, equal expansion       Heart:  Regular rate and rhythm, S1, S2 normal, no murmur, click, rub or gallop, cap refill normal      Abdomen: Soft, non-tender. Bowel sounds normal. No masses,  No organomegaly. Extremities: Extremities normal, atraumatic, no cyanosis or edema. Pulses: 2+ and symmetric all extremities. Skin: Skin color pale, texture, turgor normal. No rashes or lesions   Neurologic: CNII-XII intact. No focal motor or sensory deficit.            Laboratory Studies:    CMP:   Lab Results   Component Value Date/Time     2018 08:25 PM    K 3.2 (L) 2018 08:25 PM     2018 08:25 PM    CO2 24 2018 08:25 PM    AGAP 9 2018 08:25 PM     (H) 2018 08:25 PM    BUN 9 2018 08:25 PM    CREA 0.81 2018 08:25 PM    GFRAA >60 2018 08:25 PM    GFRNA >60 2018 08:25 PM    CA 8.6 2018 08:25 PM    MG 1.6 2018 08:25 PM PHOS 3.3 05/12/2018 08:25 PM    ALB 3.1 (L) 05/12/2018 08:25 PM    TP 6.8 05/12/2018 08:25 PM    GLOB 3.7 05/12/2018 08:25 PM    AGRAT 0.8 05/12/2018 08:25 PM    SGOT 20 05/12/2018 08:25 PM    ALT 19 05/12/2018 08:25 PM     CBC:   Lab Results   Component Value Date/Time    WBC 6.6 05/12/2018 08:25 PM    HGB 7.3 (L) 05/12/2018 08:25 PM    HCT 23.0 (L) 05/12/2018 08:25 PM     05/12/2018 08:25 PM     All Cardiac Markers in the last 24 hours:   Lab Results   Component Value Date/Time    CPK 44 05/12/2018 08:25 PM    CKMB <1.0 05/12/2018 08:25 PM    CKND1  05/12/2018 08:25 PM     CALCULATION NOT PERFORMED WHEN RESULT IS BELOW LINEAR LIMIT    Jerardo Hun <0.02 05/12/2018 08:25 PM       Imaging studies personally reviewed:  EKG: no acute ST changes  CXR: clear      Brigitte Oz, DO  Internal Medicine/Geriatrics

## 2018-05-13 NOTE — ROUTINE PROCESS
TRANSFER - IN REPORT:    Verbal report received from Floridalma Salcedo RN (name) on Fady Fountain  being received from ED (unit) for routine progression of care      Report consisted of patients Situation, Background, Assessment and   Recommendations(SBAR). Information from the following report(s) SBAR, Kardex and ED Summary was reviewed with the receiving nurse. Opportunity for questions and clarification was provided. Assessment completed upon patients arrival to unit and care assumed.

## 2018-05-13 NOTE — PROGRESS NOTES
0730 Assumed care of pt from Lacey Martin. Pt resting quietly in bed , no signs of distress, call bell within reach. 0815 Pt off unit to get 2D echo     0941 Assessment complete. Pt denies chest pain or shortness of breath. 1100 IDR with Geovanny Springer, pt does not want to have blood transfusion done. MD to call hematologist for consult. 1630 Called to room, pt states that she would like to proceed with getting blood transfusion today. Let Dr Johnny Arredondo know. Verbal order given for PRN Ativan to be used for anxiety. Shift uneventful. Pt rested throughout shift. Denied chest pain or shortness of breath. Pt refused blood transfusion this morning, but changed her mind this afternoon and elected to proceed with getting it.

## 2018-05-14 VITALS
OXYGEN SATURATION: 100 % | BODY MASS INDEX: 28.3 KG/M2 | SYSTOLIC BLOOD PRESSURE: 121 MMHG | DIASTOLIC BLOOD PRESSURE: 68 MMHG | TEMPERATURE: 98.1 F | RESPIRATION RATE: 18 BRPM | WEIGHT: 165.79 LBS | HEIGHT: 64 IN | HEART RATE: 73 BPM

## 2018-05-14 LAB
ANION GAP SERPL CALC-SCNC: 8 MMOL/L (ref 3–18)
BASOPHILS # BLD: 0 K/UL (ref 0–0.06)
BASOPHILS NFR BLD: 1 % (ref 0–2)
BUN SERPL-MCNC: 10 MG/DL (ref 7–18)
BUN/CREAT SERPL: 12 (ref 12–20)
CALCIUM SERPL-MCNC: 8.7 MG/DL (ref 8.5–10.1)
CHLORIDE SERPL-SCNC: 108 MMOL/L (ref 100–108)
CO2 SERPL-SCNC: 24 MMOL/L (ref 21–32)
CREAT SERPL-MCNC: 0.82 MG/DL (ref 0.6–1.3)
DIFFERENTIAL METHOD BLD: ABNORMAL
EOSINOPHIL # BLD: 0.1 K/UL (ref 0–0.4)
EOSINOPHIL NFR BLD: 1 % (ref 0–5)
ERYTHROCYTE [DISTWIDTH] IN BLOOD BY AUTOMATED COUNT: 19.7 % (ref 11.6–14.5)
FERRITIN SERPL-MCNC: 504 NG/ML (ref 8–388)
GLUCOSE SERPL-MCNC: 95 MG/DL (ref 74–99)
HCT VFR BLD AUTO: 23.8 % (ref 35–45)
HGB BLD-MCNC: 7.4 G/DL (ref 12–16)
LYMPHOCYTES # BLD: 1.9 K/UL (ref 0.9–3.6)
LYMPHOCYTES NFR BLD: 30 % (ref 21–52)
MCH RBC QN AUTO: 31.4 PG (ref 24–34)
MCHC RBC AUTO-ENTMCNC: 31.1 G/DL (ref 31–37)
MCV RBC AUTO: 100.8 FL (ref 74–97)
MONOCYTES # BLD: 0.4 K/UL (ref 0.05–1.2)
MONOCYTES NFR BLD: 6 % (ref 3–10)
NEUTS SEG # BLD: 3.9 K/UL (ref 1.8–8)
NEUTS SEG NFR BLD: 62 % (ref 40–73)
PLATELET # BLD AUTO: 265 K/UL (ref 135–420)
PMV BLD AUTO: 9 FL (ref 9.2–11.8)
POTASSIUM SERPL-SCNC: 3.8 MMOL/L (ref 3.5–5.5)
RBC # BLD AUTO: 2.36 M/UL (ref 4.2–5.3)
RETICS/RBC NFR AUTO: 11.6 % (ref 0.5–2.3)
SODIUM SERPL-SCNC: 140 MMOL/L (ref 136–145)
WBC # BLD AUTO: 6.3 K/UL (ref 4.6–13.2)

## 2018-05-14 PROCEDURE — 74011250637 HC RX REV CODE- 250/637: Performed by: INTERNAL MEDICINE

## 2018-05-14 PROCEDURE — 99218 HC RM OBSERVATION: CPT

## 2018-05-14 PROCEDURE — 80048 BASIC METABOLIC PNL TOTAL CA: CPT | Performed by: INTERNAL MEDICINE

## 2018-05-14 PROCEDURE — 85045 AUTOMATED RETICULOCYTE COUNT: CPT | Performed by: INTERNAL MEDICINE

## 2018-05-14 PROCEDURE — 85025 COMPLETE CBC W/AUTO DIFF WBC: CPT | Performed by: INTERNAL MEDICINE

## 2018-05-14 PROCEDURE — 82728 ASSAY OF FERRITIN: CPT | Performed by: INTERNAL MEDICINE

## 2018-05-14 PROCEDURE — 36415 COLL VENOUS BLD VENIPUNCTURE: CPT | Performed by: INTERNAL MEDICINE

## 2018-05-14 RX ORDER — METOPROLOL SUCCINATE 25 MG/1
25 TABLET, EXTENDED RELEASE ORAL DAILY
Qty: 30 TAB | Refills: 0 | Status: SHIPPED | OUTPATIENT
Start: 2018-05-15

## 2018-05-14 RX ORDER — LANOLIN ALCOHOL/MO/W.PET/CERES
1 CREAM (GRAM) TOPICAL 2 TIMES DAILY WITH MEALS
Status: DISCONTINUED | OUTPATIENT
Start: 2018-05-14 | End: 2018-05-14 | Stop reason: HOSPADM

## 2018-05-14 RX ORDER — LANOLIN ALCOHOL/MO/W.PET/CERES
325 CREAM (GRAM) TOPICAL 2 TIMES DAILY WITH MEALS
Qty: 60 TAB | Refills: 0 | Status: SHIPPED | OUTPATIENT
Start: 2018-05-14

## 2018-05-14 RX ADMIN — FERROUS SULFATE TAB 325 MG (65 MG ELEMENTAL FE) 325 MG: 325 (65 FE) TAB at 08:10

## 2018-05-14 RX ADMIN — RIVAROXABAN 20 MG: 10 TABLET, FILM COATED ORAL at 08:10

## 2018-05-14 RX ADMIN — METOPROLOL SUCCINATE 25 MG: 25 TABLET, EXTENDED RELEASE ORAL at 08:10

## 2018-05-14 NOTE — ROUTINE PROCESS
Dual AVS reviewed with GWEN Bro RN. All medications reviewed individually with patient. Opportunities for questions and concerns provided. Patient discharged via (mode of transport ie. Car, ambulance or air transport) car  Patient's arm band appropriately discarded.

## 2018-05-14 NOTE — DISCHARGE SUMMARY
Discharge Summary    Patient: King Rajan MRN: [id number]  CSN: [id number]    YOB: 1985  Age: 29 y.o. Sex: female    DOA: 5/12/2018 LOS:  LOS: 1 day   Discharge Date:      Primary Care Provider:  None    Admission Diagnoses: Chest pain  SVT (supraventricular tachycardia) ([facility]  [facility]ricular [facility]    Discharge Diagnoses:    Problem List as of 5/14/2018  Date Reviewed: 5/13/2018          Codes Class Noted - Resolved    Chest pain ICD-10-CM: R07.9  ICD-9-CM: 786.50  5/12/2018 - Present        * (Principal)SVT (supraventricular tachycardia) ([facility] ICD-10-CM: I47.1  ICD-9-CM: 427.89  5/12/2018 - Present        Miscarriage ICD-10-CM: O03.9  ICD-9-CM: 634.90  4/13/2018 - Present        Atelectasis ICD-10-CM: J98.11  ICD-9-CM: 518.0  4/13/2018 - Present        Coagulation defect ([facility] ICD-10-CM: D68.9  ICD-9-CM: 286.9  4/13/2018 - Present        Tachycardia ICD-10-CM: R00.0  ICD-9-CM: 785.0  4/13/2018 - Present        Pulmonary nodule ICD-10-CM: R91.1  ICD-9-CM: 793.11  4/13/2018 - Present        Vaginal bleeding ICD-10-CM: N93.9  ICD-9-CM: 623.8  4/12/2018 - Present        Anemia ICD-10-CM: D64.9  ICD-9-CM: 285.9  4/12/2018 - Present        Portal vein thrombosis ICD-10-CM: A16  ICD-9-CM: 160  10/14/2017 - Present        Abdominal pain ICD-10-CM: R10.9  ICD-9-CM: 789.00  10/14/2017 - Present        Pleural effusion ICD-10-CM: J90  ICD-9-CM: 511.9  10/14/2017 - Present        Mesenteric vein thrombosis ICD-10-CM: O23  ICD-9-CM: 557.0  10/14/2017 - Present              Discharge Medications:     Current Discharge Medication List      START taking these medications    Details   metoprolol succinate (TOPROL-XL) 25 mg XL tablet Take 1 Tab by mouth daily. Qty: 30 Tab, Refills: 0         CONTINUE these medications which have CHANGED    Details   ferrous sulfate 325 mg (65 mg iron) tablet Take 1 Tab by mouth two (2) times daily (with meals).   Qty: 60 Tab, Refills: 0      rivaroxaban (XARELTO) 20 mg tab tablet Take 1 Tab by mouth daily (with breakfast). Indications: blood clots  Qty: 30 Tab, Refills: 0         CONTINUE these medications which have NOT CHANGED    Details   omeprazole (PRILOSEC) 40 mg capsule Take 40 mg by mouth daily. oxyCODONE-acetaminophen (PERCOCET) 5-325 mg per tablet Take 1 Tab by mouth every eight (8) hours as needed. Max Daily Amount: 3 Tabs. Qty: 15 Tab, Refills: 0             Discharge Condition: Stable    Procedures : None    Consults: Cardiology and Hematology/Oncology      PHYSICAL EXAM    Visit Vitals    /68 (BP 1 Location: Left arm, BP Patient Position: At rest;Sitting)    Pulse 73    Temp 98.1 °F (36.7 °C)    Resp 18    Ht 5' 4\" (1.626 m)    Wt 75.2 kg (165 lb 12.6 oz)    SpO2 100%    Breastfeeding No    BMI 28.46 kg/m2     General: Awake, cooperative, no acute distress    HEENT: NC, Atraumatic. PERRLA, EOMI. Anicteric sclerae. Lungs:  CTA Bilaterally. No Wheezing/Rhonchi/Rales. Heart:  Regular  rhythm,  No murmur, No Rubs, No Gallops  Abdomen: Soft, Non distended, Non tender. +Bowel sounds,   Extremities: No c/c/e  Psych:   Not anxious or agitated. Neurologic:  No acute neurological deficits. Admission HPI : Alyssa Harvey is a 29 y.o. female with past medical history significant for portal vein thrombosis w recent hospitalization following miscarriage and severe anemia presents to the ER with diaphoresis and anxiety surrouinding a blood transfusion which was ordered for anemia due to menorrhagia. She reports her heart rate was elevated during the transfusion accompanied by anxiety, chest pressure and palptations.     On presentation to the ER she was afebrile, tachycardic w episodes of HR in the 160-170s, normotensive. Exam was unremarkable. Labs w hg of 7.3, K3.2. EKG with out acute ST changes, CXR clear. Medicine is asked to admit for further management. Hospital Course :  This patient was admitted to medical services on May 12, 2018 for symptomatic anemia secondary to menorrhagia. She was admitted and cardiology was consulted as she had periods of SVT while receiving a blood transfusion, and also experienced some chest pressure. She had an echocardiogram which was unremarkable, and started on metoprolol per cardiology recommendations. She was seen by her oncologist group, Antoinette Cyr, and Dr Shaheed Chen recommended that she resume her usual Xarelto dose, and follow up with them closely in the clinic upon discharge. She had no further episodes of SVT, remained stable throughout the course of her hospitalization. She will be discharged today to home in stable condition. She was advised to follow up with her PCP, her oncologist, and Dr Nuris Antonio in 1 month. Activity: Activity as tolerated    Diet: Regular Diet    Follow-up: PCP; Oncology - Dr Shaheed Chen; Cardiology - Dr Nuris Antonio    Disposition: Home, stable condition. Minutes spent on discharge: 35       Labs: Results:       Chemistry Recent Labs      05/14/18 0339 05/13/18 0900 05/12/18 2025   GLU  95  89  104*   NA  140  142  141   K  3.8  3.7  3.2*   CL  108  109*  108   CO2  24  26  24   BUN  10  7  9   CREA  0.82  0.69  0.81   CA  8.7  8.6  8.6   AGAP  8  7  9   BUCR  12  10*  11*   AP   --    --   63   TP   --    --   6.8   ALB   --    --   3.1*   GLOB   --    --   3.7   AGRAT   --    --   0.8      CBC w/Diff Recent Labs      05/14/18 0339 05/13/18 2315 05/13/18 0900 05/12/18 2025   WBC  6.3  6.5  6.0  6.6   RBC  2.36*  2.27*  2.35*  2.35*   HGB  7.4*  7.2*  7.3*  7.3*   HCT  23.8*  22.7*  23.2*  23.0*   PLT  265  233  235  235   GRANS  62   --   67  68   LYMPH  30   --   25  25   EOS  1   --   1  1      Cardiac Enzymes Recent Labs      05/12/18 2025   CPK  44   CKND1  CALCULATION NOT PERFORMED WHEN RESULT IS BELOW LINEAR LIMIT      Coagulation No results for input(s): PTP, INR, APTT in the last 72 hours.     No lab exists for component: INREXT    Lipid Panel No results found for: CHOL, CHOLPOCT, CHOLX, CHLST, CHOLV, 356137, HDL, LDL, LDLC, DLDLP, 808855, VLDLC, VLDL, TGLX, TRIGL, TRIGP, TGLPOCT, CHHD, CHHDX   BNP No results for input(s): BNPP in the last 72 hours. Liver Enzymes Recent Labs      18   TP  6.8   ALB  3.1*   AP  63   SGOT  20      Thyroid Studies Lab Results   Component Value Date/Time    TSH 2.36 2018 08:25 PM            Significant Diagnostic Studies: Xr Chest Sngl V    Result Date: 2018  --------------------------------------------------------------------------- <<<<<<<<<           [facility]  [facility]           >>>>>>>>> --------------------------------------------------------------------------- CLINICAL HISTORY:  Chest pain. COMPARISON EXAMINATIONS:  2018. ---  SINGLE FRONTAL VIEW OF THE CHEST  --- The lungs and pleural spaces are clear. The mediastinum is unremarkable in appearance. No significant osseous abnormalities are identified.  --------------    Impression: No active pulmonary disease. Duplex Abd Visc Art/gracie/organs Limited    Result Date: 2018                                                               Study ID: [id number]                                                   [address],  [address]                         Hepatic Portal Duplex Report Name: Roberto Lombard [facility] Date:              2018 09:04 AM MRN: [id number]                        Patient Location: VASCULAR LAB : 1985                     Age: 29 yrs Gender: Female                      Account #: [de-identified] Reason For Study: History of portal and superior mesenteric vein thrombosis by CT scan Ordering Physician: Aye Adhikari Referring Physician: Dale Schaffer Performed By: Jamison Grijalva Interpretation Summary The main portal vein is thrombosed with occlusive thrombus and an absence of venous Doppler flow. The superior mesenteric vein is partially thrombosed with non-occlusive thrombus and continuous venous Doppler flow. No prior study for comparison. _____________________________________________________________________________ _ Procedure Limited study of the portal and superior mesenteric veins to assess thrombus identified on CT scan of 10/2017. ICD-10. I98. K55.052. Portal Circulation The main portal vein shows evidence of intraluminal echogenic material and an absence of venous flow with doppler. The right portal vein shows evidence of intraluminal echogenic material and an absence of venous flow with doppler. The left portal vein shows evidence of intraluminal echogenic material and an absence of venous flow with doppler. Hepatic Veins/Inferior Vena Cava The hepatic veins are widely patent with no evidence of intraluminal thrombus. Flow in the hepatic veins is normally directed toward the inferior vena cava. The inferior vena cava is patent with spontaneous and phasic flow with respiration. Messenteric Vein The superior mesenteric vein is partially thrombosed with hyperechoic non- occlusive thrombus visualized and continuous venous flow. Hepatic Artery Velocity The hepatic vein is patent with no evidence of hemodynamically significant stenosis. The hepatic artery velocity is 129 cm/s.                         DR Ebony Valentin MD   04/25/2018 12:35 Electronically signed byPM         No results found for this or any previous visit.         CC: None

## 2018-05-14 NOTE — DISCHARGE INSTRUCTIONS
DISCHARGE SUMMARY from Nurse    PATIENT INSTRUCTIONS:    After general anesthesia or intravenous sedation, for 24 hours or while taking prescription Narcotics:  · Limit your activities  · Do not drive and operate hazardous machinery  · Do not make important personal or business decisions  · Do  not drink alcoholic beverages  · If you have not urinated within 8 hours after discharge, please contact your surgeon on call. Report the following to your surgeon:  · Excessive pain, swelling, redness or odor of or around the surgical area  · Temperature over 100.5  · Nausea and vomiting lasting longer than 4 hours or if unable to take medications  · Any signs of decreased circulation or nerve impairment to extremity: change in color, persistent  numbness, tingling, coldness or increase pain  · Any questions    What to do at Home:  Recommended activity: Activity as tolerated      *  Please give a list of your current medications to your Primary Care Provider. *  Please update this list whenever your medications are discontinued, doses are      changed, or new medications (including over-the-counter products) are added. *  Please carry medication information at all times in case of emergency situations. These are general instructions for a healthy lifestyle:    No smoking/ No tobacco products/ Avoid exposure to second hand smoke  Surgeon General's Warning:  Quitting smoking now greatly reduces serious risk to your health. Obesity, smoking, and sedentary lifestyle greatly increases your risk for illness    A healthy diet, regular physical exercise & weight monitoring are important for maintaining a healthy lifestyle    You may be retaining fluid if you have a history of heart failure or if you experience any of the following symptoms:  Weight gain of 3 pounds or more overnight or 5 pounds in a week, increased swelling in our hands or feet or shortness of breath while lying flat in bed.   Please call your doctor as soon as you notice any of these symptoms; do not wait until your next office visit. Recognize signs and symptoms of STROKE:    F-face looks uneven    A-arms unable to move or move unevenly    S-speech slurred or non-existent    T-time-call 911 as soon as signs and symptoms begin-DO NOT go       Back to bed or wait to see if you get better-TIME IS BRAIN. Warning Signs of HEART ATTACK     Call 911 if you have these symptoms:   Chest discomfort. Most heart attacks involve discomfort in the center of the chest that lasts more than a few minutes, or that goes away and comes back. It can feel like uncomfortable pressure, squeezing, fullness, or pain.  Discomfort in other areas of the upper body. Symptoms can include pain or discomfort in one or both arms, the back, neck, jaw, or stomach.  Shortness of breath with or without chest discomfort.  Other signs may include breaking out in a cold sweat, nausea, or lightheadedness. Don't wait more than five minutes to call 911 - MINUTES MATTER! Fast action can save your life. Calling 911 is almost always the fastest way to get lifesaving treatment. Emergency Medical Services staff can begin treatment when they arrive -- up to an hour sooner than if someone gets to the hospital by car. The discharge information has been reviewed with the patient. The patient verbalized understanding. Discharge medications reviewed with the patient and appropriate educational materials and side effects teaching were provided.   ___________________________________________________________________________________________________________________________________

## 2018-05-14 NOTE — PROGRESS NOTES
Problem: Falls - Risk of  Goal: *Absence of Falls  Document Joceline Fall Risk and appropriate interventions in the flowsheet.    Outcome: Progressing Towards Goal  Fall Risk Interventions:            Medication Interventions: Assess postural VS orthostatic hypotension, Bed/chair exit alarm, Patient to call before getting OOB, Teach patient to arise slowly         History of Falls Interventions: Bed/chair exit alarm, Investigate reason for fall, Room close to nurse's station

## 2018-05-14 NOTE — PROGRESS NOTES
Reason for Admission: This is a 29 y.o. female who was here in our observation unit for blood transfusion. She has a history of heavy vaginal bleeding and iron deficiency anemia. She was approximately an hour to an hour and a half into her transfusion today when she felt like her heart was racing. She felt anxious. She notified nursing staff. It was noted that she was tachycardic and hypertensive. The transfusion was immediately stopped and she was brought here and checked in as an emergency patient. She states that she feels like she can't calm down. She feels like her heart is racing. She does not feel fevers or chills. She has not had shortness of breath or chest pain. She has not had abdominal pain nausea vomiting or diarrhea. She does not have back pain.   She has not had rash or itching.    patient admitted under observation for SVT                   RRAT Score:                     Plan for utilizing home health:    no                    Likelihood of Readmission:  no                       Transition of Care Plan:      Patient lives with her s/o but going to stay with her father who will pick her up.see AVS for follow up appointments no DMe or other needs from cm plan d/c home today

## 2018-05-14 NOTE — PROGRESS NOTES
2110- Order for blood is to be transfused if hemoglobin is less than 7. Pt's hemoglobin is  7.3, Verified with Dr Thelma Farnsworth and was instructed to wait for H/H results in AM, to make determination for blood transfusion. Pt is asymptomatic. Pt made aware. 200- Dr Bianca Romero in to see Pt. Made aware to hold blood transfusion tonight. Will transfuse tomorrow if needed. 0630- Pt had uneventful shift.

## 2018-05-14 NOTE — ROUTINE PROCESS
2047 Assumed care of pt from [facility]. Pt resting quietly in bed , no signs of distress, call bell within reach. 0745 Assessment completed. Pt denies chest pain, or shortness of breath. States that she would like to go home. She plans on following up with Dr. Patrice Haney, since she moved back to [facility] from [place]. Shift uneventful. Pt denied chest pain, shortness of breath or discomfort. Able to ambulate without assistance.  to call pt with appt times for cardiology and hematology follow up.

## 2018-05-14 NOTE — CONSULTS
Consult Date: 2018    IP CONSULT TO ONCOLOGY  Consult performed by: Mejia Bean  Consult ordered by: Annalisa Parikh        Chief complaint:  Racing heart during blood transfusion and after blood transfusion when patient was at home. Subjective  29year-old female presents to hospital following rapid heart rate of up to 160 approximately midway through a blood transfusion which was stopped. Patient reports she experienced this rapid heart rate again after returning home from the blood transfusion. She was admitted last night, the evening after these events. Patient reports no further episodes since admission. There is no documentation of the rapid heart rate on EKG or rhythm strip. The episode was very frightening to the patient. Of note she has never had these symptoms with prior transfusions including transfusion given at this hospital.  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 She was initially treated with IV heparin and then switched to Josiane. In 2018 she became pregnant and was changed to subcutaneous Lovenox until a miscarriage occurred at week 20, at which time she was switched back to Josiane. She developed significant post  menorrhagia and vaginal bleeding requiring blood transfusions. Ferritin fell to 24 on 18 and she was treated with Injectafer X 2 doses. Complete occlusion of the portal vein and partial occlusion of the SMV was documented on 18 and Xarelto was restarted on 2018. Due to hemoglobin of 7.6, her Josiane was stopped on May 7 and transfusion was ordered. Past Medical History:  No date: Anemia  No date: Anemia  No date: Fibroids  No date: Gastritis  2017: H/O blood clots     Comment: \"vein that connects to intestine\"  No date: Hiatal hernia  10/15/2017:  History of blood clots     Comment: Intestinal  03/2018: Miscarriage within last 12 months  No date: Pancreatitis   Past Surgical History:  No date: HX DILATION AND CURETTAGE  No date: HX HERNIA REPAIR  History reviewed. No pertinent family history. Smoking status: Former Smoker     Smokeless tobacco: Never Used    Alcohol use Yes  1.8 oz/week    1 Glasses of wine, 1 Cans of beer, 1 Shots of liquor per week            Current Facility-Administered Medications:  ferrous sulfate tablet 325 mg 1 Tab Oral DAILY WITH BREAKFAST Yaneth Helms,  mg at 05/13/18 5219   0.9% sodium chloride infusion 250 mL 250 mL IntraVENous PRN Maggie Chatterjee MD    diphenhydrAMINE (BENADRYL) capsule 25 mg 25 mg Oral Q6H PRN Maggie Chatterjee MD    metoprolol succinate (TOPROL-XL) XL tablet 25 mg 25 mg Oral DAILY Ross Chanel MD 25 mg at 05/13/18 1626   LORazepam (ATIVAN) injection 1 mg 1 mg IntraVENous Q4H PRN Maggie Chatterjee MD    metoprolol (LOPRESSOR) injection 5 mg 5 mg IntraVENous Q6H PRN Yaneth Helms, DO    acetaminophen (TYLENOL) tablet 650 mg 650 mg Oral Q4H PRN Yaneth Helms, DO    Facility-Administered Medications Ordered in Other Encounters:  0.9% sodium chloride infusion 250 mL 250 mL IntraVENous PRN Nenita Borjas MD Last Rate: 15 mL/hr at 05/12/18 1038 250 mL at 05/12/18 1038        Review of Systems   Constitutional: Positive for fatigue. Negative for appetite change, chills, diaphoresis, fever and unexpected weight change. HENT: Negative. Eyes: Negative. Respiratory: Negative. Cardiovascular: Positive for palpitations. Gastrointestinal: Negative. Endocrine: Negative. Genitourinary: Negative. Musculoskeletal: Negative. Skin: Negative. Neurological: Negative. Hematological: Negative. Psychiatric/Behavioral: Negative.         Objective    Vital signs for last 24 hours:  /90 (BP 1 Location: Left arm, BP Patient Position: At rest)  Pulse 89  Temp 98.3 °F (36.8 °C)  Resp 18  Ht 5' 4\" (1.626 m)  Wt 74 kg (163 lb 2.3 oz)  LMP 05/01/2018  SpO2 100%  Breastfeeding? No  BMI 28 kg/m2    Intake/Output this shift:  Current Shift:    Last 3 Shifts: 05/12 0701 - 05/13 1900  In: 740 [P.O.:720; I.V.:20]  Out: 1200 [Urine:1200]    Data Review:   Recent Results (from the past 24 hour(s))  -CBC WITH AUTOMATED DIFF   Collection Time: 05/13/18  9:00 AM   Result Value Ref Range   WBC 6.0 4.6 - 13.2 K/uL   RBC 2.35 (L) 4.20 - 5.30 M/uL   HGB 7.3 (L) 12.0 - 16.0 g/dL   HCT 23.2 (L) 35.0 - 45.0 %   MCV 98.7 (H) 74.0 - 97.0 FL   MCH 31.1 24.0 - 34.0 PG   MCHC 31.5 31.0 - 37.0 g/dL   RDW 19.3 (H) 11.6 - 14.5 %   PLATELET 512 468 - 153 K/uL   MPV 8.5 (L) 9.2 - 11.8 FL   NEUTROPHILS 67 40 - 73 %   LYMPHOCYTES 25 21 - 52 %   MONOCYTES 6 3 - 10 %   EOSINOPHILS 1 0 - 5 %   BASOPHILS 1 0 - 2 %   ABS. NEUTROPHILS 3.9 1.8 - 8.0 K/UL   ABS. LYMPHOCYTES 1.5 0.9 - 3.6 K/UL   ABS. MONOCYTES 0.4 0.05 - 1.2 K/UL   ABS. EOSINOPHILS 0.1 0.0 - 0.4 K/UL   ABS.  BASOPHILS 0.1 (H) 0.0 - 0.06 K/UL   PLATELET COMMENTS ADEQUATE PLATELETS    RBC COMMENTS ANISOCYTOSIS  2+      RBC COMMENTS MACROCYTOSIS  1+      RBC COMMENTS POLYCHROMASIA  1+      RBC COMMENTS POIKILOCYTOSIS  1+      DF AUTOMATED    -METABOLIC PANEL, BASIC   Collection Time: 05/13/18  9:00 AM   Result Value Ref Range   Sodium 142 136 - 145 mmol/L   Potassium 3.7 3.5 - 5.5 mmol/L   Chloride 109 (H) 100 - 108 mmol/L   CO2 26 21 - 32 mmol/L   Anion gap 7 3.0 - 18 mmol/L   Glucose 89 74 - 99 mg/dL   BUN 7 7.0 - 18 MG/DL   Creatinine 0.69 0.6 - 1.3 MG/DL   BUN/Creatinine ratio 10 (L) 12 - 20     GFR est AA >60 >60 ml/min/1.73m2   GFR est non-AA >60 >60 ml/min/1.73m2   Calcium 8.6 8.5 - 10.1 MG/DL   -TYPE + CROSSMATCH   Collection Time: 05/13/18  5:00 PM   Result Value Ref Range   Crossmatch Expiration 05/16/2018    ABO/Rh(D) O POSITIVE    Antibody screen NEG    Unit number [id number]    Blood component type  LR    Unit division 00    Status of unit ALLOCATED    Crossmatch result Compatible Physical Exam   Constitutional: She is oriented to person, place, and time. She appears well-developed and well-nourished. HENT:   Head: Normocephalic and atraumatic. Eyes: EOM are normal. Pupils are equal, round, and reactive to light. Neck: Normal range of motion. Neck supple. Cardiovascular: Normal rate and regular rhythm. Pulmonary/Chest: Effort normal and breath sounds normal.   Abdominal: Soft. Bowel sounds are normal.   Musculoskeletal: Normal range of motion. Neurological: She is alert and oriented to person, place, and time. Skin: Skin is warm. Vitals reviewed. Assessment/Plan    29year old female with history of thrombosis (no definitive hypercoaguable state identified on testing) managed on Xaralto which has been complicated by heavy periods requiring parenteral iron and blood  transfusions. With yesterday's transfusion she developed tachycardia and shortness of  Breath which was quite disturbing to her. I discussed the case with the hospitalist on call this evening. Given that they patient is asymptomatic from her anemia, we will not give transfusion this evening for a hemoglobin of 7.3. She is currentlly off Xaralto and her periods have abated since being off [address] for the last 7 day. We will contact Dr. Olga Franz tomorrow morning to further discuss plans for transfusion support and repeat her CBC and ferritin in the morning. Patient, nurse and hospitalist are all in agreement. Difficult situation given problems with bleeding and clotting. I believe it will will be helpful to reach out to our gyn colleague for guidance in suppressing menses. Thank you for this consultation. My colleague Dr. Miki Lopez will be following up during this hospitalization.     MD Justin Simons MD

## 2018-05-14 NOTE — PROGRESS NOTES
2110- Order for blood is to be transfused if hemoglobin is less than 7. Pt's hemoglobin is  7.3, Verified with Dr London Monzon and was instructed to wait for H/H results in AM, to make determination for blood transfusion. Pt is asymptomatic. Pt made aware. 200- Dr Regino Mcburney in to see Pt. Made aware to hold blood transfusion tonight. Will transfuse tomorrow if needed.

## 2018-05-14 NOTE — PROGRESS NOTES
Review of Systems   Constitutional: Negative for activity change, appetite change, chills, diaphoresis, fatigue, fever and unexpected weight change. HENT: Negative for congestion, dental problem, ear discharge, ear pain, facial swelling, hearing loss, mouth sores, nosebleeds, postnasal drip, rhinorrhea, sinus pain, sinus pressure, sneezing, sore throat, tinnitus, trouble swallowing and voice change. Eyes: Negative for photophobia, pain, discharge, redness, itching and visual disturbance. Respiratory: Negative for apnea, cough, choking, chest tightness, shortness of breath, wheezing and stridor. Cardiovascular: Negative for chest pain, palpitations and leg swelling. Gastrointestinal: Negative for abdominal distention, abdominal pain, anal bleeding, blood in stool, constipation, nausea, rectal pain and vomiting. Endocrine: Negative for cold intolerance, heat intolerance, polydipsia, polyphagia and polyuria. Genitourinary: Positive for menstrual problem and vaginal bleeding. Negative for decreased urine volume, difficulty urinating, dysuria, enuresis, flank pain, frequency, genital sores, hematuria, pelvic pain, urgency, vaginal discharge and vaginal pain. Musculoskeletal: Negative for arthralgias, back pain, gait problem, joint swelling, myalgias, neck pain and neck stiffness. Skin: Negative for color change, pallor, rash and wound. Allergic/Immunologic: Negative for environmental allergies, food allergies and immunocompromised state. Neurological: Negative for dizziness, tremors, seizures, syncope, facial asymmetry, speech difficulty, weakness, light-headedness, numbness and headaches. Hematological: Negative for adenopathy. Does not bruise/bleed easily. Psychiatric/Behavioral: Negative for agitation, behavioral problems, confusion, decreased concentration, dysphoric mood, hallucinations, self-injury, sleep disturbance and suicidal ideas. The patient is nervous/anxious.       Temp:  [97.7 °F (36.5 °C)-98.6 °F (37 °C)]   Pulse (Heart Rate):  []   BP: ()/(53-95)   Resp Rate:  [12-22]   O2 Sat (%):  [95 %-100 %]   Weight:  [73.9 kg (163 lb)-75.2 kg (165 lb 12.6 oz)]     Feeling better today. No bleeding. Off xarelto. Plans to have gyn surgery in [place] to remove the fibroids. No palpitations. Had 1500 mg of injectafer April 23 - should not be iron deficient  Has been off xarelto for 1 week, had dose in the er yesterday  Taking onw slow iron a day. Will increase to bid    Physical Exam   Constitutional: She is oriented to person, place, and time. She appears well-developed and well-nourished. No distress. HENT:   Head: Normocephalic and atraumatic. Nose: Nose normal.   Mouth/Throat: Oropharynx is clear and moist.   Eyes: Conjunctivae and EOM are normal. Pupils are equal, round, and reactive to light. No scleral icterus. Neck: No JVD present. No tracheal deviation present. No thyromegaly present. Cardiovascular: Normal rate, regular rhythm and normal heart sounds. Exam reveals no gallop and no friction rub. No murmur heard. Pulmonary/Chest: Effort normal and breath sounds normal. No stridor. No respiratory distress. She has no wheezes. She has no rales. She exhibits no tenderness. Abdominal: She exhibits no distension and no mass. There is no tenderness. There is no rebound and no guarding. Musculoskeletal: Normal range of motion. She exhibits no edema, tenderness or deformity. Lymphadenopathy:     She has no cervical adenopathy. Neurological: She is alert and oriented to person, place, and time. No cranial nerve deficit. Coordination normal.   Skin: No rash noted. She is not diaphoretic. No erythema. No pallor. Psychiatric: She has a normal mood and affect.  Her behavior is normal. Judgment and thought content normal.     Principal Problem:    SVT (supraventricular tachycardia) (HCC) (5/12/2018)    Active Problems:    Portal vein thrombosis (10/14/2017) Anemia (4/12/2018)      Chest pain (5/12/2018)    Plan:  1. Continue iron bid  2. Resume xarelto. When surgery for fibroids is scheduled we will assist in converting to lovenox  3.hypercoaguable workup negative  4. Hgb today is stable with Results for Joss Arambula (MRN [id number]) as of 5/14/2018 05:30   Ref. Range 5/14/2018 03:39   WBC Latest Ref Range: 4.6 - 13.2 K/uL 6.3   RBC Latest Ref Range: 4.20 - 5.30 M/uL 2.36 (L)   HGB Latest Ref Range: 12.0 - 16.0 g/dL 7.4 (L)   HCT Latest Ref Range: 35.0 - 45.0 % 23.8 (L)   MCV Latest Ref Range: 74.0 - 97.0 .8 (H)   MCH Latest Ref Range: 24.0 - 34.0 PG 31.4   MCHC Latest Ref Range: 31.0 - 37.0 g/dL 31.1   RDW Latest Ref Range: 11.6 - 14.5 % 19.7 (H)   PLATELET Latest Ref Range: 135 - 420 K/uL 265   MPV Latest Ref Range: 9.2 - 11.8 FL 9.0 (L)   NEUTROPHILS Latest Ref Range: 40 - 73 % 62   LYMPHOCYTES Latest Ref Range: 21 - 52 % 30   MONOCYTES Latest Ref Range: 3 - 10 % 6   EOSINOPHILS Latest Ref Range: 0 - 5 % 1   BASOPHILS Latest Ref Range: 0 - 2 % 1   DF Latest Units:   AUTOMATED   ABS. NEUTROPHILS Latest Ref Range: 1.8 - 8.0 K/UL 3.9   ABS. LYMPHOCYTES Latest Ref Range: 0.9 - 3.6 K/UL 1.9   ABS. MONOCYTES Latest Ref Range: 0.05 - 1.2 K/UL 0.4   ABS. EOSINOPHILS Latest Ref Range: 0.0 - 0.4 K/UL 0.1   ABS. BASOPHILS Latest Ref Range: 0.0 - 0.06 K/UL 0.0   Sodium Latest Ref Range: 136 - 145 mmol/L 140   Potassium Latest Ref Range: 3.5 - 5.5 mmol/L 3.8   Chloride Latest Ref Range: 100 - 108 mmol/L 108   CO2 Latest Ref Range: 21 - 32 mmol/L 24   Anion gap Latest Ref Range: 3.0 - 18 mmol/L 8   Glucose Latest Ref Range: 74 - 99 mg/dL 95   BUN Latest Ref Range: 7.0 - 18 MG/DL 10   Creatinine Latest Ref Range: 0.6 - 1.3 MG/DL 0.82   BUN/Creatinine ratio Latest Ref Range: 12 - 20   12   Calcium Latest Ref Range: 8.5 - 10.1 MG/DL 8.7   GFR est non-AA Latest Ref Range: >60 ml/min/1.73m2 >60   GFR est AA Latest Ref Range: >60 ml/min/1.73m2 >60     5.  Elevated MCV may be reticulocytes - should be plenty of iron  6. Since she has moved to [facility], I will give her an appt to followup with me later this week in [place] office  7. Agree with metoprolol. 8. Ferritin and retic pending  9. Agree with holding transfusion for now. She should be able to make her own blood. IF bleeding becomes significant - will dose dose reduce xarelto to 15 mg a day  10. Ok to discharge from my standpoint. She would like to go home.

## 2018-05-14 NOTE — ROUTINE PROCESS
Bedside and Verbal shift change report given to Real Cason RN (oncoming nurse) by Jose Carlos Haile RN (offgoing nurse). Report included the following information SBAR and Kardex.

## 2018-05-15 NOTE — PROGRESS NOTES
Oral and Written notification given to patient and/or caregiver informing them that they are currently an Outpatient receiving care in our facility. Outpatient services include Observation Services under [address] and [facility] requirements.

## 2018-05-17 LAB
ABO + RH BLD: NORMAL
BLD PROD TYP BPU: NORMAL
BLOOD GROUP ANTIBODIES SERPL: NORMAL
BPU ID: NORMAL
CALCULATED P AXIS, ECG09: 61 DEGREES
CALCULATED R AXIS, ECG10: 70 DEGREES
CALCULATED T AXIS, ECG11: -81 DEGREES
CROSSMATCH RESULT,%XM: NORMAL
DIAGNOSIS, 93000: NORMAL
Q-T INTERVAL, ECG07: 336 MS
QRS DURATION, ECG06: 78 MS
QTC CALCULATION (BEZET), ECG08: 485 MS
SPECIMEN EXP DATE BLD: NORMAL
STATUS OF UNIT,%ST: NORMAL
UNIT DIVISION, %UDIV: 0
VENTRICULAR RATE, ECG03: 125 BPM

## 2022-08-03 NOTE — ED NOTES
0730 Pt received in room. Pt denies si/hi/ah/vh. Pt reports going to groups and eating outside in the dayroom. Anxiety=0/10 depression= 0/10. Pt is Aox4. Pt is calm and cooperative. Med and meal compliant. Pain level of 0/10 . Will continue to monitor for any changes.        Please see MAR for PRN medication administration Pelvic exam complete.  Pt pedro pablo well